# Patient Record
Sex: FEMALE | Race: WHITE | NOT HISPANIC OR LATINO | URBAN - METROPOLITAN AREA
[De-identification: names, ages, dates, MRNs, and addresses within clinical notes are randomized per-mention and may not be internally consistent; named-entity substitution may affect disease eponyms.]

---

## 2020-07-03 ENCOUNTER — INPATIENT (INPATIENT)
Facility: HOSPITAL | Age: 60
LOS: 3 days | Discharge: HOME HEALTH | End: 2020-07-07
Attending: SURGERY | Admitting: SURGERY
Payer: COMMERCIAL

## 2020-07-03 VITALS
WEIGHT: 240.08 LBS | DIASTOLIC BLOOD PRESSURE: 80 MMHG | TEMPERATURE: 99 F | OXYGEN SATURATION: 100 % | HEART RATE: 95 BPM | SYSTOLIC BLOOD PRESSURE: 122 MMHG

## 2020-07-03 DIAGNOSIS — Z98.891 HISTORY OF UTERINE SCAR FROM PREVIOUS SURGERY: Chronic | ICD-10-CM

## 2020-07-03 DIAGNOSIS — Z90.09 ACQUIRED ABSENCE OF OTHER PART OF HEAD AND NECK: Chronic | ICD-10-CM

## 2020-07-03 LAB
ALBUMIN SERPL ELPH-MCNC: 3.4 G/DL — LOW (ref 3.5–5.2)
ALP SERPL-CCNC: 166 U/L — HIGH (ref 30–115)
ALT FLD-CCNC: 29 U/L — SIGNIFICANT CHANGE UP (ref 0–41)
ANION GAP SERPL CALC-SCNC: 16 MMOL/L — HIGH (ref 7–14)
AST SERPL-CCNC: 14 U/L — SIGNIFICANT CHANGE UP (ref 0–41)
BILIRUB DIRECT SERPL-MCNC: 0.2 MG/DL — SIGNIFICANT CHANGE UP (ref 0–0.2)
BILIRUB INDIRECT FLD-MCNC: 0.5 MG/DL — SIGNIFICANT CHANGE UP (ref 0.2–1.2)
BILIRUB SERPL-MCNC: 0.7 MG/DL — SIGNIFICANT CHANGE UP (ref 0.2–1.2)
BLD GP AB SCN SERPL QL: SIGNIFICANT CHANGE UP
BUN SERPL-MCNC: 21 MG/DL — HIGH (ref 10–20)
CALCIUM SERPL-MCNC: 8.9 MG/DL — SIGNIFICANT CHANGE UP (ref 8.5–10.1)
CHLORIDE SERPL-SCNC: 96 MMOL/L — LOW (ref 98–110)
CO2 SERPL-SCNC: 23 MMOL/L — SIGNIFICANT CHANGE UP (ref 17–32)
CREAT SERPL-MCNC: 0.5 MG/DL — LOW (ref 0.7–1.5)
GLUCOSE SERPL-MCNC: 135 MG/DL — HIGH (ref 70–99)
HCT VFR BLD CALC: 39 % — SIGNIFICANT CHANGE UP (ref 37–47)
HGB BLD-MCNC: 12.7 G/DL — SIGNIFICANT CHANGE UP (ref 12–16)
LIDOCAIN IGE QN: 6 U/L — LOW (ref 7–60)
MCHC RBC-ENTMCNC: 27.7 PG — SIGNIFICANT CHANGE UP (ref 27–31)
MCHC RBC-ENTMCNC: 32.6 G/DL — SIGNIFICANT CHANGE UP (ref 32–37)
MCV RBC AUTO: 85.2 FL — SIGNIFICANT CHANGE UP (ref 81–99)
NRBC # BLD: 0 /100 WBCS — SIGNIFICANT CHANGE UP (ref 0–0)
PLATELET # BLD AUTO: 475 K/UL — HIGH (ref 130–400)
POTASSIUM SERPL-MCNC: 3.8 MMOL/L — SIGNIFICANT CHANGE UP (ref 3.5–5)
POTASSIUM SERPL-SCNC: 3.8 MMOL/L — SIGNIFICANT CHANGE UP (ref 3.5–5)
PROT SERPL-MCNC: 6.5 G/DL — SIGNIFICANT CHANGE UP (ref 6–8)
RBC # BLD: 4.58 M/UL — SIGNIFICANT CHANGE UP (ref 4.2–5.4)
RBC # FLD: 15.4 % — HIGH (ref 11.5–14.5)
SODIUM SERPL-SCNC: 135 MMOL/L — SIGNIFICANT CHANGE UP (ref 135–146)
WBC # BLD: 11.7 K/UL — HIGH (ref 4.8–10.8)
WBC # FLD AUTO: 11.7 K/UL — HIGH (ref 4.8–10.8)

## 2020-07-03 PROCEDURE — 93010 ELECTROCARDIOGRAM REPORT: CPT

## 2020-07-03 PROCEDURE — 99285 EMERGENCY DEPT VISIT HI MDM: CPT

## 2020-07-03 PROCEDURE — 74177 CT ABD & PELVIS W/CONTRAST: CPT | Mod: 26

## 2020-07-03 PROCEDURE — 76705 ECHO EXAM OF ABDOMEN: CPT | Mod: 26

## 2020-07-03 RX ORDER — ONDANSETRON 8 MG/1
4 TABLET, FILM COATED ORAL ONCE
Refills: 0 | Status: COMPLETED | OUTPATIENT
Start: 2020-07-03 | End: 2020-07-03

## 2020-07-03 RX ORDER — LEVOTHYROXINE SODIUM 125 MCG
200 TABLET ORAL DAILY
Refills: 0 | Status: DISCONTINUED | OUTPATIENT
Start: 2020-07-03 | End: 2020-07-07

## 2020-07-03 RX ORDER — MORPHINE SULFATE 50 MG/1
4 CAPSULE, EXTENDED RELEASE ORAL ONCE
Refills: 0 | Status: DISCONTINUED | OUTPATIENT
Start: 2020-07-03 | End: 2020-07-03

## 2020-07-03 RX ORDER — ATORVASTATIN CALCIUM 80 MG/1
20 TABLET, FILM COATED ORAL AT BEDTIME
Refills: 0 | Status: DISCONTINUED | OUTPATIENT
Start: 2020-07-03 | End: 2020-07-07

## 2020-07-03 RX ORDER — CIPROFLOXACIN LACTATE 400MG/40ML
400 VIAL (ML) INTRAVENOUS ONCE
Refills: 0 | Status: COMPLETED | OUTPATIENT
Start: 2020-07-03 | End: 2020-07-03

## 2020-07-03 RX ORDER — METRONIDAZOLE 500 MG
500 TABLET ORAL ONCE
Refills: 0 | Status: COMPLETED | OUTPATIENT
Start: 2020-07-03 | End: 2020-07-03

## 2020-07-03 RX ORDER — SODIUM CHLORIDE 9 MG/ML
1000 INJECTION, SOLUTION INTRAVENOUS
Refills: 0 | Status: DISCONTINUED | OUTPATIENT
Start: 2020-07-03 | End: 2020-07-04

## 2020-07-03 RX ORDER — PIPERACILLIN AND TAZOBACTAM 4; .5 G/20ML; G/20ML
3.38 INJECTION, POWDER, LYOPHILIZED, FOR SOLUTION INTRAVENOUS ONCE
Refills: 0 | Status: COMPLETED | OUTPATIENT
Start: 2020-07-03 | End: 2020-07-03

## 2020-07-03 RX ADMIN — ATORVASTATIN CALCIUM 20 MILLIGRAM(S): 80 TABLET, FILM COATED ORAL at 23:49

## 2020-07-03 RX ADMIN — SODIUM CHLORIDE 1000 MILLILITER(S): 9 INJECTION, SOLUTION INTRAVENOUS at 18:35

## 2020-07-03 RX ADMIN — ONDANSETRON 4 MILLIGRAM(S): 8 TABLET, FILM COATED ORAL at 18:40

## 2020-07-03 RX ADMIN — Medication 100 MILLIGRAM(S): at 19:24

## 2020-07-03 RX ADMIN — ONDANSETRON 4 MILLIGRAM(S): 8 TABLET, FILM COATED ORAL at 16:00

## 2020-07-03 RX ADMIN — Medication 200 MILLIGRAM(S): at 19:53

## 2020-07-03 RX ADMIN — MORPHINE SULFATE 4 MILLIGRAM(S): 50 CAPSULE, EXTENDED RELEASE ORAL at 18:40

## 2020-07-03 NOTE — ED PROVIDER NOTE - CLINICAL SUMMARY MEDICAL DECISION MAKING FREE TEXT BOX
Patient presented for 6 days of LLQ >RUQ pain + nausea. Diarrhea started today. Pt afebrile, not ill appearing. + LLQ TTP, mild RUQ tenderness. RUQ US showed cholelithiasis wo evidence of cholecystitis. Labs overall WNL. IVF zofran given. CT showed ___ Patient presented for 6 days of LLQ >RUQ pain + nausea. Diarrhea started today. Pt afebrile, not ill appearing. + LLQ TTP, mild RUQ tenderness. RUQ US showed cholelithiasis wo evidence of cholecystitis. Labs overall WNL. CT showed sigmoid diverticulitis with free air and 5.6cm deep pelvic abscess, along with reactive ileus. Gen surg consulted at 6:13. pt given IVF, morphine, zofran, zosyn and kept NPO. Patient presented for 6 days of LLQ >RUQ pain + nausea. Diarrhea started today. Pt afebrile, not ill appearing. + LLQ TTP, mild RUQ tenderness. RUQ US showed cholelithiasis wo evidence of cholecystitis. Labs overall WNL. CT showed sigmoid diverticulitis with free air and 5.6cm deep pelvic abscess, along with reactive ileus. Gen surg consulted at 6:13. pt given IVF, morphine, zofran, cipro, flagyl and kept NPO. Dr. Aguilar admitting to service and consulting IR for possible abscess drainage.

## 2020-07-03 NOTE — H&P ADULT - NSHPLABSRESULTS_GEN_ALL_CORE
LABS:  Labs:  CAPILLARY BLOOD GLUCOSE                        12.7   11.70 )-----------( 475      ( 03 Jul 2020 14:43 )             39.0         07-03    135  |  96<L>  |  21<H>  ----------------------------<  135<H>  3.8   |  23  |  0.5<L>      Calcium, Total Serum: 8.9 mg/dL (07-03-20 @ 14:43)      LFTs:             6.5  | 0.7  | 14       ------------------[166     ( 03 Jul 2020 14:43 )  3.4  | 0.2  | 29          Lipase:6      Amylase:x           Coags:      RADIOLOGY & ADDITIONAL STUDIES:    < from: CT Abdomen and Pelvis w/ IV Cont (07.03.20 @ 17:54) >  1. Findings consistent with perforated sigmoid diverticulitis with free air and 5.6 cm deep pelvic partially rim-enhancing early abscess.  2. Abnormally dilated small bowel, likely reactive ileus.  < end of copied text >

## 2020-07-03 NOTE — ED PROVIDER NOTE - CARE PLAN
Principal Discharge DX:	Diverticulitis of sigmoid colon  Secondary Diagnosis:	Abscess  Secondary Diagnosis:	Perforation and abscess of large intestine concurrent with and due to diverticulitis

## 2020-07-03 NOTE — H&P ADULT - ASSESSMENT
ASSESSMENT:  59y Female     PLAN:        D/w  ASSESSMENT:  59y Female with complaints of LLQ abd pain.    PLAN:  -f/u radiology  -Plan Operative management vs. Non-op management      D/w Dr. Aguilar ASSESSMENT:  59y Female PMH thyroidectomy with complaints of 5d LLQ abd pain.  LLQ tenderness, CT findings, WBC count consistent w/ perforated sigmoid diverticulitis w/ abscess    PLAN:  NPO, IVF  Abx   Pain control  IR consult  Rapid COVID  D/w Dr. Aguilar    Senior Note  I have personally examined and evaluated the patient  I agree with the above plan and note, and I have edited where appropriate  LLQ tenderness, CT findings, WBC 11.7 consistent w/ perforated sigmoid diverticulitis w/ abscess  Tachy to 102, will order fluid bolus  NPO, IVF. Cipro/flagyl in the setting of pen allergy. Pain control. Serial abd exams, CBCs. IR consult d/w MD [facility] #[id number] re: drainage of abscess  Surgical Attending Dr. Aguilar aware and agrees with plan

## 2020-07-03 NOTE — CONSULT NOTE ADULT - SUBJECTIVE AND OBJECTIVE BOX
INTERVENTIONAL RADIOLOGY CONSULT:     Procedure Requested: image guided abscess drainage    HPI:  DIONNE PETERSEN [id number]  59y Female    HPI: Pt complains of ABD pain since 5 days. She also admits to having constipation once in a while and fever will chills 2 days ago. Patient also mentioned having nausea and vomiting 2 days ago but not today. (2020 19:56)      PAST MEDICAL & SURGICAL HISTORY:  Acquired hypothyroidism  H/O:   H/O thyroidectomy      MEDICATIONS  (STANDING):  lactated ringers. 1000 milliLiter(s) (1000 mL/Hr) IV Continuous <Continuous>    MEDICATIONS  (PRN):      Allergies    penicillin (Other)    Intolerances        Social History:   Smoking: Yes [ ]  No [ ]   ______pk yrs  ETOH  Yes [ ]  No [ ]  Social [ ]  DRUGS:  Yes [ ]  No [ ]  if so what______________    FAMILY HISTORY:      Physical Exam:   Vital Signs Last 24 Hrs  T(C): 37.2 (2020 16:20), Max: 37.2 (2020 13:47)  T(F): 99 (2020 16:20), Max: 99 (2020 16:20)  HR: 102 (2020 16:20) (95 - 102)  BP: 112/56 (2020 16:20) (112/56 - 122/80)  BP(mean): --  RR: --  SpO2: 98% (2020 16:20) (98% - 100%)    General:     Lungs:    Cardiovascular:     Abdomen:    Extremities:    Musculoskeletal:    Neuro/Psych:        Labs:                         12.7   11.70 )-----------( 475      ( 2020 14:43 )             39.0     07-03    135  |  96<L>  |  21<H>  ----------------------------<  135<H>  3.8   |  23  |  0.5<L>    Ca    8.9      2020 14:43    TPro  6.5  /  Alb  3.4<L>  /  TBili  0.7  /  DBili  0.2  /  AST  14  /  ALT  29  /  AlkPhos  166<H>  07-03        Pertinent labs:                      12.7   11.70 )-----------( 475      ( 2020 14:43 )             39.0       -    135  |  96<L>  |  21<H>  ----------------------------<  135<H>  3.8   |  23  |  0.5<L>    Ca    8.9      2020 14:43    TPro  6.5  /  Alb  3.4<L>  /  TBili  0.7  /  DBili  0.2  /  AST  14  /  ALT  29  /  AlkPhos  166<H>            Radiology & Additional Studies:     Radiology imaging reviewed.       ASSESSMENT/ PLAN:           Risks, benefits, and alternatives to treatment discussed. All questions answered with understanding.    Thank you for the courtesy of this consult, please call [phone] with any further questions. INTERVENTIONAL RADIOLOGY CONSULT:     Procedure Requested: image guided abscess drainage    HPI: Patient is a 7y year old female w/perforated diverticulitis. She states she began having abdominal pain for about 5 days. Reports worsening of pain in her lower abdomen, worst in LLQ. States she had a BM today, loose stools. Having nausea, no vomiting today. Denies chest pain or SOB. . (2020 19:56)    PAST MEDICAL & SURGICAL HISTORY:  Acquired hypothyroidism  H/O:   H/O thyroidectomy for thyroid CA (    MEDICATIONS  Takes a statin and synthroid. No blood thinners    Allergies  penicillin - rash    Social History: Non-smoker    FAMILY HISTORY: No hx of cancer in family    Physical Exam:   Vital Signs Last 24 Hrs  T(C): 37.2 (2020 16:20), Max: 37.2 (2020 13:47)  T(F): 99 (2020 16:20), Max: 99 (2020 16:20)  HR: 102 (2020 16:20) (95 - 102)  BP: 112/56 (2020 16:20) (112/56 - 122/80)  SpO2: 98% (2020 16:20) (98% - 100%)    Gen: awake, NAD  Psych: affect and mood appropriate. Responds to questions appropriately  Neuro: no facial droop  Neck: no JVD  CV: normal AP diameter  Pulm: non-labored breathing  Abd: non distended  Ex: moving upper extremities spontaneously  Skin: no large lesions or rashes on the face or neck    Labs:                         12.7   11.70 )-----------( 475      ( 2020 14:43 )             39.0     07-03    135  |  96<L>  |  21<H>  ----------------------------<  135<H>  3.8   |  23  |  0.5<L>    Ca    8.9      2020 14:43    TPro  6.5  /  Alb  3.4<L>  /  TBili  0.7  /  DBili  0.2  /  AST  14  /  ALT  29  /  AlkPhos  166<H>  07-03    Radiology & Additional Studies:     7/3 - CT abdomen pelvis - 5.6 cm intermesenteric diverticular abscess within pelvis. Dilated loops of small bowel    A/P - 58 y/o F w/diverticular abscess    1. Abscess - Collection within the pelvis, narrow window for safe approach. May not be amenable to image guided percutaneous drainage. Will review imaging with attending. Please keep patient NPO.    Risks, benefits, and alternatives to treatment discussed. All questions answered with understanding.    Thank you for the courtesy of this consult, please call [phone] with any further questions.

## 2020-07-03 NOTE — H&P ADULT - HISTORY OF PRESENT ILLNESS
DIONNE PETERSEN [id number]  59y Female    HPI: DIONNE PETERSEN [id number]  59y Female    HPI: Pt complains of ABD pain since 5 days. She also admits to having constipation once in a while and fever will chills 2 days ago. Patient also mentioned having nausea and vomiting 2 days ago but not today. DIONNE PETERSEN [id number]  59y Female    HPI:   59F w/ PMH/PSH of thyroidectomy 2005 and radiation for thyroid neoplasm. Presents today w/ ABD pain for 5 days, located in the LLQ. Nonradiating. Associated with f/c and n/v 2 days ago. Never experienced before, no exacerbating/relieving fx. Pt denies CP, SOB, diarrhea, fever, chills, urinary symptoms. Last BM this AM, normal. Last c-scope 11 years ago, reportedly normal. ADELINA PETERSENSA [id number]  59y Female    HPI:   59F w/ PMH/PSH of thyroidectomy 2005 and radiation for thyroid neoplasm. Presents today w/ ABD pain for 5 days, located in the LLQ. Nonradiating. Associated with f/c and n/v 2 days ago. Never experienced before, no exacerbating/relieving fx. Pt denies CP, SOB, diarrhea, fever, chills, urinary symptoms. Last BM this AM, normal. Last c-scope 11 years ago, reportedly normal.      .

## 2020-07-03 NOTE — ED ADULT NURSE NOTE - NSIMPLEMENTINTERV_GEN_ALL_ED
Implemented All Universal Safety Interventions:  [place] to call system. Call bell, personal items and telephone within reach. Instruct patient to call for assistance. Room bathroom lighting operational. Non-slip footwear when patient is off stretcher. Physically safe environment: no spills, clutter or unnecessary equipment. Stretcher in lowest position, wheels locked, appropriate side rails in place.

## 2020-07-03 NOTE — ED PROVIDER NOTE - PROGRESS NOTE DETAILS
Pt presenting with LLQ pain, nausea, diarrhea. On exam LLQ + RUQ TTP. Pt afebrile, not ill-appearing. Will eval for diverticulitis, cholecystitis/cholelithiasis, colitis, will give IVF, zofran, obtain labs, UA, CT abd/pelvis, RUQ US, and will re-eval. Preliminary labs and imaging reviewed. Gallstone with sludge noted on US, no evidence of acute cholecystitis. Pt reports improvement of Sx. Pending CT scan and will re-eval. IVF, zofran given. Spoke to surgery resident initially at 18:13, and again right now...they will get back to me whether Dr. Aguilar will be admitting (saw her in 2005) or medicine service. Pain controlled. Will re-eval in 30mins.

## 2020-07-03 NOTE — H&P ADULT - NSHPSOURCEINFORD_GEN_ALL_CORE
CERTIFICATE OF SCHOOL    November 28, 2017      Re: Hilda Blankenship  [address]  [facility] [address]      This is to certify that Hilda Blankenship has been under my care from 11/28/2017 and can return to school today    RESTRICTIONS: none        SIGNATURE:___________________________________________,   11/28/2017        Stalin Luna MD  [facility]  Dermatology  [address]  [address] [phone]           Chart(s)/Patient

## 2020-07-03 NOTE — H&P ADULT - NSHPPHYSICALEXAM_GEN_ALL_CORE
Vital Signs Last 24 Hrs  T(C): 37.2 (03 Jul 2020 16:20), Max: 37.2 (03 Jul 2020 13:47)  T(F): 99 (03 Jul 2020 16:20), Max: 99 (03 Jul 2020 16:20)  HR: 102 (03 Jul 2020 16:20) (95 - 102)  BP: 112/56 (03 Jul 2020 16:20) (112/56 - 122/80)  BP(mean): --  RR: --  SpO2: 98% (03 Jul 2020 16:20) (98% - 100%)    PHYSICAL EXAM:  GENERAL: NAD, well-appearing  CHEST/LUNG: Clear to auscultation bilaterally  HEART: Regular rate and rhythm  ABDOMEN: Soft, Nontender, Nondistended;   EXTREMITIES:  No clubbing, cyanosis, or edema Vital Signs Last 24 Hrs  T(C): 37.2 (03 Jul 2020 16:20), Max: 37.2 (03 Jul 2020 13:47)  T(F): 99 (03 Jul 2020 16:20), Max: 99 (03 Jul 2020 16:20)  HR: 102 (03 Jul 2020 16:20) (95 - 102)  BP: 112/56 (03 Jul 2020 16:20) (112/56 - 122/80)  BP(mean): --  RR: --  SpO2: 98% (03 Jul 2020 16:20) (98% - 100%)    PHYSICAL EXAM:  GENERAL: NAD, well-appearing  HEENT: Patient has a birth franky on her left upper eyelid  CHEST/LUNG: Clear to auscultation bilaterally  HEART: Regular rate and rhythm  ABDOMEN: tenderness LLQ+, mild generalized abd pain  EXTREMITIES:  No clubbing, cyanosis, or edema Vital Signs Last 24 Hrs  T(C): 37.2 (03 Jul 2020 16:20), Max: 37.2 (03 Jul 2020 13:47)  T(F): 99 (03 Jul 2020 16:20), Max: 99 (03 Jul 2020 16:20)  HR: 102 (03 Jul 2020 16:20) (95 - 102)  BP: 112/56 (03 Jul 2020 16:20) (112/56 - 122/80)  BP(mean): --  RR: --  SpO2: 98% (03 Jul 2020 16:20) (98% - 100%)    PHYSICAL EXAM:  GENERAL: NAD, well-appearing  HEENT: Patient has a birth franky on her left upper eyelid  CHEST/LUNG: Clear to auscultation bilaterally  HEART: Regular rate and rhythm  ABDOMEN: tenderness LLQ+, soft, nondistended, obese  EXTREMITIES:  No clubbing, cyanosis, or edema

## 2020-07-03 NOTE — H&P ADULT - ATTENDING COMMENTS
above noted ct scan noted labs noted discussed case with surgical chief resident for repeat ct scan on 7/5/20  drainage by IR on monday

## 2020-07-03 NOTE — ED ADULT TRIAGE NOTE - CHIEF COMPLAINT QUOTE
Patient c/o LLQ pain with diarrhea and nausea x 6 days, Patient denies and fevers and recent antibiotic use.

## 2020-07-03 NOTE — ED PROVIDER NOTE - PHYSICAL EXAMINATION
CONSTITUTIONAL: Well-developed; well-nourished; in no acute distress. Sitting up and providing appropriate history and physical examination  SKIN: skin exam is warm and dry, no acute rash.  HEAD: Normocephalic; atraumatic.  EYES: PERRL, 3 mm bilateral, no nystagmus, EOM intact; conjunctiva and sclera clear.  ENT: No nasal discharge; airway clear.  NECK: Supple; non tender. + full passive ROM in all directions. No JVD  CARD: S1, S2 normal; no murmurs, gallops, or rubs. Regular rate and rhythm. + Symmetric Strong Pulses  RESP: No wheezes, rales or rhonchi. Good air movement bilaterally  ABD: soft; non-distended; Moderate TTP LLQ. Mild tenderness RUQ. No Rebound, No Guarding, No signs of peritonitis, No CVA tenderness. No pulsatile abdominal mass. + Strong and Symmetric Pulses  EXT: Normal ROM. No clubbing, cyanosis or edema. Dp and Pt Pulses intact. Cap refill less than 3 seconds  NEURO: CN 2-12 intact, normal finger to nose, normal romberg, stable gait, no sensory or motor deficits, Alert, oriented, grossly unremarkable. No Focal deficits. GCS 15. NIH 0  PSYCH: Cooperative, appropriate..

## 2020-07-03 NOTE — ED PROVIDER NOTE - NS ED ROS FT
Constitutional: See HPI.  Eyes: No visual changes, eye pain or discharge. No Photophobia  ENMT: No hearing changes, pain, discharge or infections. No neck pain or stiffness. No limited ROM  Cardiac: No SOB or edema. No chest pain with exertion.  Respiratory: No cough or respiratory distress. No hemoptysis. No history of asthma or RAD.  GI: + abd pain, diarrhea, nausea. No vomiting. No bloody or melanotic stool.   : No dysuria, frequency or burning. No Discharge  MS: No myalgia, muscle weakness, joint pain or back pain.  Neuro: No headache or weakness. No LOC.  Skin: No skin rash.  Except as documented in the HPI, all other systems are negative.

## 2020-07-03 NOTE — ED PROVIDER NOTE - OBJECTIVE STATEMENT
59F with remote Hx CS, thyroidectomy who presents to [facility] for LLQ pain x 6 days, dull, intermittent, no exacerbating factors, associated with nausea wo vomiting. Today she began having loose diarrhea, and felt weak all over and decided to be evaluated further. Denies bloody/melanotic stool, urinary Sx, fevers, chills, recent travel. Last colonoscopy was 1 year ago and was WNL. No Hx of diverticulitis or kidney stones.

## 2020-07-04 LAB
ANION GAP SERPL CALC-SCNC: 14 MMOL/L — SIGNIFICANT CHANGE UP (ref 7–14)
ANION GAP SERPL CALC-SCNC: 16 MMOL/L — HIGH (ref 7–14)
APPEARANCE UR: ABNORMAL
APTT BLD: 24.8 SEC — LOW (ref 27–39.2)
BACTERIA # UR AUTO: ABNORMAL
BASOPHILS # BLD AUTO: 0.03 K/UL — SIGNIFICANT CHANGE UP (ref 0–0.2)
BASOPHILS # BLD AUTO: 0.04 K/UL — SIGNIFICANT CHANGE UP (ref 0–0.2)
BASOPHILS NFR BLD AUTO: 0.3 % — SIGNIFICANT CHANGE UP (ref 0–1)
BASOPHILS NFR BLD AUTO: 0.3 % — SIGNIFICANT CHANGE UP (ref 0–1)
BILIRUB UR-MCNC: ABNORMAL
BUN SERPL-MCNC: 12 MG/DL — SIGNIFICANT CHANGE UP (ref 10–20)
BUN SERPL-MCNC: 21 MG/DL — HIGH (ref 10–20)
CALCIUM SERPL-MCNC: 7.5 MG/DL — LOW (ref 8.5–10.1)
CALCIUM SERPL-MCNC: 8.3 MG/DL — LOW (ref 8.5–10.1)
CHLORIDE SERPL-SCNC: 95 MMOL/L — LOW (ref 98–110)
CHLORIDE SERPL-SCNC: 99 MMOL/L — SIGNIFICANT CHANGE UP (ref 98–110)
CO2 SERPL-SCNC: 24 MMOL/L — SIGNIFICANT CHANGE UP (ref 17–32)
CO2 SERPL-SCNC: 25 MMOL/L — SIGNIFICANT CHANGE UP (ref 17–32)
COLOR SPEC: YELLOW — SIGNIFICANT CHANGE UP
CREAT SERPL-MCNC: 0.5 MG/DL — LOW (ref 0.7–1.5)
CREAT SERPL-MCNC: 0.6 MG/DL — LOW (ref 0.7–1.5)
DIFF PNL FLD: ABNORMAL
EOSINOPHIL # BLD AUTO: 0.04 K/UL — SIGNIFICANT CHANGE UP (ref 0–0.7)
EOSINOPHIL # BLD AUTO: 0.07 K/UL — SIGNIFICANT CHANGE UP (ref 0–0.7)
EOSINOPHIL NFR BLD AUTO: 0.3 % — SIGNIFICANT CHANGE UP (ref 0–8)
EOSINOPHIL NFR BLD AUTO: 0.7 % — SIGNIFICANT CHANGE UP (ref 0–8)
EPI CELLS # UR: 7 /HPF — HIGH (ref 0–5)
GLUCOSE SERPL-MCNC: 86 MG/DL — SIGNIFICANT CHANGE UP (ref 70–99)
GLUCOSE SERPL-MCNC: 99 MG/DL — SIGNIFICANT CHANGE UP (ref 70–99)
GLUCOSE UR QL: NEGATIVE — SIGNIFICANT CHANGE UP
HCG SERPL-ACNC: <0.6 MIU/ML — SIGNIFICANT CHANGE UP
HCT VFR BLD CALC: 32.3 % — LOW (ref 37–47)
HCT VFR BLD CALC: 35 % — LOW (ref 37–47)
HCV AB S/CO SERPL IA: 0.03 COI — SIGNIFICANT CHANGE UP
HCV AB SERPL-IMP: SIGNIFICANT CHANGE UP
HGB BLD-MCNC: 10.3 G/DL — LOW (ref 12–16)
HGB BLD-MCNC: 11.4 G/DL — LOW (ref 12–16)
HYALINE CASTS # UR AUTO: 25 /LPF — HIGH (ref 0–7)
IMM GRANULOCYTES NFR BLD AUTO: 1.1 % — HIGH (ref 0.1–0.3)
IMM GRANULOCYTES NFR BLD AUTO: 1.4 % — HIGH (ref 0.1–0.3)
INR BLD: 1.32 RATIO — HIGH (ref 0.65–1.3)
KETONES UR-MCNC: ABNORMAL
LEUKOCYTE ESTERASE UR-ACNC: ABNORMAL
LYMPHOCYTES # BLD AUTO: 1.31 K/UL — SIGNIFICANT CHANGE UP (ref 1.2–3.4)
LYMPHOCYTES # BLD AUTO: 1.45 K/UL — SIGNIFICANT CHANGE UP (ref 1.2–3.4)
LYMPHOCYTES # BLD AUTO: 10 % — LOW (ref 20.5–51.1)
LYMPHOCYTES # BLD AUTO: 13.6 % — LOW (ref 20.5–51.1)
MAGNESIUM SERPL-MCNC: 1.7 MG/DL — LOW (ref 1.8–2.4)
MAGNESIUM SERPL-MCNC: 1.8 MG/DL — SIGNIFICANT CHANGE UP (ref 1.8–2.4)
MCHC RBC-ENTMCNC: 27.2 PG — SIGNIFICANT CHANGE UP (ref 27–31)
MCHC RBC-ENTMCNC: 27.9 PG — SIGNIFICANT CHANGE UP (ref 27–31)
MCHC RBC-ENTMCNC: 31.9 G/DL — LOW (ref 32–37)
MCHC RBC-ENTMCNC: 32.6 G/DL — SIGNIFICANT CHANGE UP (ref 32–37)
MCV RBC AUTO: 85.4 FL — SIGNIFICANT CHANGE UP (ref 81–99)
MCV RBC AUTO: 85.8 FL — SIGNIFICANT CHANGE UP (ref 81–99)
MONOCYTES # BLD AUTO: 1.05 K/UL — HIGH (ref 0.1–0.6)
MONOCYTES # BLD AUTO: 1.35 K/UL — HIGH (ref 0.1–0.6)
MONOCYTES NFR BLD AUTO: 10.3 % — HIGH (ref 1.7–9.3)
MONOCYTES NFR BLD AUTO: 9.8 % — HIGH (ref 1.7–9.3)
NEUTROPHILS # BLD AUTO: 10.23 K/UL — HIGH (ref 1.4–6.5)
NEUTROPHILS # BLD AUTO: 7.95 K/UL — HIGH (ref 1.4–6.5)
NEUTROPHILS NFR BLD AUTO: 74.5 % — SIGNIFICANT CHANGE UP (ref 42.2–75.2)
NEUTROPHILS NFR BLD AUTO: 77.7 % — HIGH (ref 42.2–75.2)
NITRITE UR-MCNC: NEGATIVE — SIGNIFICANT CHANGE UP
NRBC # BLD: 0 /100 WBCS — SIGNIFICANT CHANGE UP (ref 0–0)
NRBC # BLD: 0 /100 WBCS — SIGNIFICANT CHANGE UP (ref 0–0)
PH UR: 6.5 — SIGNIFICANT CHANGE UP (ref 5–8)
PHOSPHATE SERPL-MCNC: 2.6 MG/DL — SIGNIFICANT CHANGE UP (ref 2.1–4.9)
PHOSPHATE SERPL-MCNC: 4.1 MG/DL — SIGNIFICANT CHANGE UP (ref 2.1–4.9)
PLATELET # BLD AUTO: 394 K/UL — SIGNIFICANT CHANGE UP (ref 130–400)
PLATELET # BLD AUTO: 427 K/UL — HIGH (ref 130–400)
POTASSIUM SERPL-MCNC: 3.4 MMOL/L — LOW (ref 3.5–5)
POTASSIUM SERPL-MCNC: 3.6 MMOL/L — SIGNIFICANT CHANGE UP (ref 3.5–5)
POTASSIUM SERPL-SCNC: 3.4 MMOL/L — LOW (ref 3.5–5)
POTASSIUM SERPL-SCNC: 3.6 MMOL/L — SIGNIFICANT CHANGE UP (ref 3.5–5)
PROT UR-MCNC: ABNORMAL
PROTHROM AB SERPL-ACNC: 15.2 SEC — HIGH (ref 9.95–12.87)
RBC # BLD: 3.78 M/UL — LOW (ref 4.2–5.4)
RBC # BLD: 4.08 M/UL — LOW (ref 4.2–5.4)
RBC # FLD: 15.7 % — HIGH (ref 11.5–14.5)
RBC # FLD: 15.8 % — HIGH (ref 11.5–14.5)
RBC CASTS # UR COMP ASSIST: 1 /HPF — SIGNIFICANT CHANGE UP (ref 0–4)
SARS-COV-2 RNA SPEC QL NAA+PROBE: SIGNIFICANT CHANGE UP
SODIUM SERPL-SCNC: 136 MMOL/L — SIGNIFICANT CHANGE UP (ref 135–146)
SODIUM SERPL-SCNC: 137 MMOL/L — SIGNIFICANT CHANGE UP (ref 135–146)
SP GR SPEC: 1.03 — HIGH (ref 1.01–1.02)
UROBILINOGEN FLD QL: SIGNIFICANT CHANGE UP
WBC # BLD: 10.67 K/UL — SIGNIFICANT CHANGE UP (ref 4.8–10.8)
WBC # BLD: 13.15 K/UL — HIGH (ref 4.8–10.8)
WBC # FLD AUTO: 10.67 K/UL — SIGNIFICANT CHANGE UP (ref 4.8–10.8)
WBC # FLD AUTO: 13.15 K/UL — HIGH (ref 4.8–10.8)
WBC UR QL: 91 /HPF — HIGH (ref 0–5)

## 2020-07-04 PROCEDURE — 71045 X-RAY EXAM CHEST 1 VIEW: CPT | Mod: 26

## 2020-07-04 RX ORDER — OXYCODONE HYDROCHLORIDE 5 MG/1
5 TABLET ORAL EVERY 6 HOURS
Refills: 0 | Status: DISCONTINUED | OUTPATIENT
Start: 2020-07-03 | End: 2020-07-04

## 2020-07-04 RX ORDER — DEXTROSE MONOHYDRATE, SODIUM CHLORIDE, AND POTASSIUM CHLORIDE 50; .745; 4.5 G/1000ML; G/1000ML; G/1000ML
1000 INJECTION, SOLUTION INTRAVENOUS
Refills: 0 | Status: DISCONTINUED | OUTPATIENT
Start: 2020-07-04 | End: 2020-07-07

## 2020-07-04 RX ORDER — SODIUM CHLORIDE 9 MG/ML
1000 INJECTION, SOLUTION INTRAVENOUS
Refills: 0 | Status: DISCONTINUED | OUTPATIENT
Start: 2020-07-03 | End: 2020-07-04

## 2020-07-04 RX ORDER — METRONIDAZOLE 500 MG
500 TABLET ORAL EVERY 8 HOURS
Refills: 0 | Status: DISCONTINUED | OUTPATIENT
Start: 2020-07-04 | End: 2020-07-07

## 2020-07-04 RX ORDER — IBUPROFEN 200 MG
600 TABLET ORAL EVERY 8 HOURS
Refills: 0 | Status: DISCONTINUED | OUTPATIENT
Start: 2020-07-03 | End: 2020-07-04

## 2020-07-04 RX ORDER — CHLORHEXIDINE GLUCONATE 213 G/1000ML
1 SOLUTION TOPICAL
Refills: 0 | Status: DISCONTINUED | OUTPATIENT
Start: 2020-07-04 | End: 2020-07-07

## 2020-07-04 RX ORDER — PANTOPRAZOLE SODIUM 20 MG/1
40 TABLET, DELAYED RELEASE ORAL DAILY
Refills: 0 | Status: DISCONTINUED | OUTPATIENT
Start: 2020-07-04 | End: 2020-07-04

## 2020-07-04 RX ORDER — POTASSIUM CHLORIDE 20 MEQ
20 PACKET (EA) ORAL
Refills: 0 | Status: COMPLETED | OUTPATIENT
Start: 2020-07-04 | End: 2020-07-05

## 2020-07-04 RX ORDER — CIPROFLOXACIN LACTATE 400MG/40ML
400 VIAL (ML) INTRAVENOUS EVERY 12 HOURS
Refills: 0 | Status: DISCONTINUED | OUTPATIENT
Start: 2020-07-04 | End: 2020-07-07

## 2020-07-04 RX ORDER — MAGNESIUM SULFATE 500 MG/ML
2 VIAL (ML) INJECTION ONCE
Refills: 0 | Status: COMPLETED | OUTPATIENT
Start: 2020-07-04 | End: 2020-07-05

## 2020-07-04 RX ORDER — ACETAMINOPHEN 500 MG
650 TABLET ORAL EVERY 6 HOURS
Refills: 0 | Status: DISCONTINUED | OUTPATIENT
Start: 2020-07-04 | End: 2020-07-07

## 2020-07-04 RX ORDER — ONDANSETRON 8 MG/1
4 TABLET, FILM COATED ORAL EVERY 8 HOURS
Refills: 0 | Status: DISCONTINUED | OUTPATIENT
Start: 2020-07-04 | End: 2020-07-07

## 2020-07-04 RX ORDER — HEPARIN SODIUM 5000 [USP'U]/ML
5000 INJECTION INTRAVENOUS; SUBCUTANEOUS EVERY 8 HOURS
Refills: 0 | Status: DISCONTINUED | OUTPATIENT
Start: 2020-07-03 | End: 2020-07-07

## 2020-07-04 RX ORDER — PANTOPRAZOLE SODIUM 20 MG/1
40 TABLET, DELAYED RELEASE ORAL DAILY
Refills: 0 | Status: DISCONTINUED | OUTPATIENT
Start: 2020-07-04 | End: 2020-07-07

## 2020-07-04 RX ORDER — METRONIDAZOLE 500 MG
500 TABLET ORAL EVERY 8 HOURS
Refills: 0 | Status: DISCONTINUED | OUTPATIENT
Start: 2020-07-04 | End: 2020-07-04

## 2020-07-04 RX ORDER — ACETAMINOPHEN 500 MG
650 TABLET ORAL EVERY 6 HOURS
Refills: 0 | Status: DISCONTINUED | OUTPATIENT
Start: 2020-07-03 | End: 2020-07-04

## 2020-07-04 RX ADMIN — Medication 100 MILLIGRAM(S): at 13:56

## 2020-07-04 RX ADMIN — Medication 200 MILLIGRAM(S): at 18:30

## 2020-07-04 RX ADMIN — Medication 500 MILLIGRAM(S): at 05:10

## 2020-07-04 RX ADMIN — HEPARIN SODIUM 5000 UNIT(S): 5000 INJECTION INTRAVENOUS; SUBCUTANEOUS at 13:56

## 2020-07-04 RX ADMIN — Medication 200 MICROGRAM(S): at 05:15

## 2020-07-04 RX ADMIN — Medication 200 MILLIGRAM(S): at 05:11

## 2020-07-04 RX ADMIN — Medication 100 MILLIGRAM(S): at 21:05

## 2020-07-04 RX ADMIN — ATORVASTATIN CALCIUM 20 MILLIGRAM(S): 80 TABLET, FILM COATED ORAL at 21:05

## 2020-07-04 RX ADMIN — DEXTROSE MONOHYDRATE, SODIUM CHLORIDE, AND POTASSIUM CHLORIDE 125 MILLILITER(S): 50; .745; 4.5 INJECTION, SOLUTION INTRAVENOUS at 15:13

## 2020-07-04 RX ADMIN — HEPARIN SODIUM 5000 UNIT(S): 5000 INJECTION INTRAVENOUS; SUBCUTANEOUS at 21:05

## 2020-07-04 RX ADMIN — ONDANSETRON 104 MILLIGRAM(S): 8 TABLET, FILM COATED ORAL at 12:15

## 2020-07-04 RX ADMIN — HEPARIN SODIUM 5000 UNIT(S): 5000 INJECTION INTRAVENOUS; SUBCUTANEOUS at 05:15

## 2020-07-04 RX ADMIN — ONDANSETRON 104 MILLIGRAM(S): 8 TABLET, FILM COATED ORAL at 01:00

## 2020-07-04 RX ADMIN — PANTOPRAZOLE SODIUM 40 MILLIGRAM(S): 20 TABLET, DELAYED RELEASE ORAL at 13:05

## 2020-07-04 RX ADMIN — SODIUM CHLORIDE 125 MILLILITER(S): 9 INJECTION, SOLUTION INTRAVENOUS at 01:14

## 2020-07-05 LAB
ANION GAP SERPL CALC-SCNC: 7 MMOL/L — SIGNIFICANT CHANGE UP (ref 7–14)
BUN SERPL-MCNC: 5 MG/DL — LOW (ref 10–20)
CALCIUM SERPL-MCNC: 7.8 MG/DL — LOW (ref 8.5–10.1)
CHLORIDE SERPL-SCNC: 103 MMOL/L — SIGNIFICANT CHANGE UP (ref 98–110)
CO2 SERPL-SCNC: 27 MMOL/L — SIGNIFICANT CHANGE UP (ref 17–32)
CREAT SERPL-MCNC: <0.5 MG/DL — LOW (ref 0.7–1.5)
CULTURE RESULTS: SIGNIFICANT CHANGE UP
GLUCOSE SERPL-MCNC: 116 MG/DL — HIGH (ref 70–99)
POTASSIUM SERPL-MCNC: 3.4 MMOL/L — LOW (ref 3.5–5)
POTASSIUM SERPL-SCNC: 3.4 MMOL/L — LOW (ref 3.5–5)
SODIUM SERPL-SCNC: 137 MMOL/L — SIGNIFICANT CHANGE UP (ref 135–146)
SPECIMEN SOURCE: SIGNIFICANT CHANGE UP

## 2020-07-05 PROCEDURE — 71045 X-RAY EXAM CHEST 1 VIEW: CPT | Mod: 26

## 2020-07-05 RX ORDER — IOHEXOL 300 MG/ML
30 INJECTION, SOLUTION INTRAVENOUS ONCE
Refills: 0 | Status: COMPLETED | OUTPATIENT
Start: 2020-07-05 | End: 2020-07-05

## 2020-07-05 RX ADMIN — DEXTROSE MONOHYDRATE, SODIUM CHLORIDE, AND POTASSIUM CHLORIDE 125 MILLILITER(S): 50; .745; 4.5 INJECTION, SOLUTION INTRAVENOUS at 08:26

## 2020-07-05 RX ADMIN — ATORVASTATIN CALCIUM 20 MILLIGRAM(S): 80 TABLET, FILM COATED ORAL at 21:10

## 2020-07-05 RX ADMIN — Medication 200 MILLIGRAM(S): at 17:05

## 2020-07-05 RX ADMIN — Medication 50 GRAM(S): at 10:49

## 2020-07-05 RX ADMIN — Medication 50 MILLIEQUIVALENT(S): at 04:41

## 2020-07-05 RX ADMIN — HEPARIN SODIUM 5000 UNIT(S): 5000 INJECTION INTRAVENOUS; SUBCUTANEOUS at 21:09

## 2020-07-05 RX ADMIN — Medication 100 MILLIGRAM(S): at 07:18

## 2020-07-05 RX ADMIN — Medication 50 MILLIEQUIVALENT(S): at 02:22

## 2020-07-05 RX ADMIN — DEXTROSE MONOHYDRATE, SODIUM CHLORIDE, AND POTASSIUM CHLORIDE 125 MILLILITER(S): 50; .745; 4.5 INJECTION, SOLUTION INTRAVENOUS at 18:27

## 2020-07-05 RX ADMIN — Medication 100 MILLIGRAM(S): at 13:58

## 2020-07-05 RX ADMIN — Medication 200 MILLIGRAM(S): at 05:03

## 2020-07-05 RX ADMIN — IOHEXOL 30 MILLILITER(S): 300 INJECTION, SOLUTION INTRAVENOUS at 19:48

## 2020-07-05 RX ADMIN — Medication 200 MICROGRAM(S): at 05:04

## 2020-07-05 RX ADMIN — HEPARIN SODIUM 5000 UNIT(S): 5000 INJECTION INTRAVENOUS; SUBCUTANEOUS at 13:11

## 2020-07-05 RX ADMIN — Medication 650 MILLIGRAM(S): at 12:23

## 2020-07-05 RX ADMIN — Medication 100 MILLIGRAM(S): at 21:09

## 2020-07-05 RX ADMIN — Medication 50 MILLIEQUIVALENT(S): at 08:27

## 2020-07-05 RX ADMIN — PANTOPRAZOLE SODIUM 40 MILLIGRAM(S): 20 TABLET, DELAYED RELEASE ORAL at 11:56

## 2020-07-05 RX ADMIN — HEPARIN SODIUM 5000 UNIT(S): 5000 INJECTION INTRAVENOUS; SUBCUTANEOUS at 05:03

## 2020-07-05 NOTE — PROGRESS NOTE ADULT - SUBJECTIVE AND OBJECTIVE BOX
GENERAL SURGERY PROGRESS NOTE     DIONNE PETERSEN  59y  Female  Hospital day :2d    OVERNIGHT EVENTS: Patient stable, refers mild pain, no fever, no N/V. F/U IR, they will discuss the possibility for drainage guided by CT with the attending.  Mild electrolytes imbalance (hypokalemia, hypomagnesemia), corrections were made    T(F): 98.8 (20 @ 19:30), Max: 98.8 (20 @ 19:30)  HR: 89 (20 @ 19:30) (89 - 98)  BP: 120/58 (20 @ 19:30) (104/50 - 120/58)  RR: 18 (20 @ 19:30) (18 - 18)  SpO2: 96% (20 @ 16:15) (96% - 98%)    DIET/FLUIDS: dextrose 5% + sodium chloride 0.45% with potassium chloride 10 mEq/L 1000 milliLiter(s) IV Continuous <Continuous>  magnesium sulfate  IVPB 2 Gram(s) IV Intermittent once  potassium chloride  20 mEq/100 mL IVPB 20 milliEquivalent(s) IV Intermittent every 2 hours    GI proph:  pantoprazole  Injectable 40 milliGRAM(s) IV Push daily    AC/ proph: heparin   Injectable 5000 Unit(s) SubCutaneous every 8 hours    ABx: ciprofloxacin   IVPB 400 milliGRAM(s) IV Intermittent every 12 hours  metroNIDAZOLE  IVPB 500 milliGRAM(s) IV Intermittent every 8 hours      PHYSICAL EXAM:  GENERAL: NAD, well-appearing  CHEST/LUNG: Clear to auscultation bilaterally  HEART: Regular rate and rhythm  ABDOMEN: Soft, Nontender, Nondistended; pain on  palpation in L abdominal quadrant   EXTREMITIES:  No clubbing, cyanosis, or edema      LABS                          10.3   10.67 )-----------( 394      ( 2020 20:44 )             32.3       Auto Neutrophil %: 74.5 % (20 @ 20:44)  Auto Immature Granulocyte %: 1.1 % (20 @ 20:44)  Auto Immature Granulocyte %: 1.4 % (20 @ 05:20)  Auto Neutrophil %: 77.7 % (20 @ 05:20)        137  |  99  |  12  ----------------------------<  86  3.4<L>   |  24  |  0.5<L>      Calcium, Total Serum: 7.5 mg/dL (20 @ 20:44)      LFTs:             6.5  | 0.7  | 14       ------------------[166     ( 2020 14:43 )  3.4  | 0.2  | 29          Lipase:6             Coags:     15.20  ----< 1.32    ( 2020 05:20 )     24.8            Urinalysis Basic - ( 2020 15:19 )    Color: Yellow / Appearance: Slightly Turbid / S.035 / pH: x  Gluc: x / Ketone: Small  / Bili: Small / Urobili: <2 mg/dL   Blood: x / Protein: 30 mg/dL / Nitrite: Negative   Leuk Esterase: Large / RBC: 1 /HPF / WBC 91 /HPF   Sq Epi: x / Non Sq Epi: 7 /HPF / Bacteria: Few        IR consult:  7/3 - CT abdomen pelvis - 5.6 cm intermesenteric diverticular abscess within pelvis. Dilated loops of small bowel  A/P - 60 y/o F w/diverticular abscess  1. Abscess - Collection within the pelvis, narrow window for safe approach. May not be amenable to image guided percutaneous drainage. Will review imaging with attending. Please keep patient NPO.  Risks, benefits, and alternatives to treatment discussed. All questions answered with understanding.  Thank you for the courtesy of this consult, please call [phone] with any further GENERAL SURGERY PROGRESS NOTE     DIONNE PETERSEN  59y  Female  Hospital day :2d    OVERNIGHT EVENTS: Patient stable, refers mild pain, no fever, no N/V. F/U IR, they will discuss the possibility for drainage guided by CT with the attending.  Mild electrolytes imbalance (hypokalemia, hypomagnesemia), corrections were made    T(F): 98.8 (20 @ 19:30), Max: 98.8 (20 @ 19:30)  HR: 89 (20 @ 19:30) (89 - 98)  BP: 120/58 (20 @ 19:30) (104/50 - 120/58)  RR: 18 (20 @ 19:30) (18 - 18)  SpO2: 96% (20 @ 16:15) (96% - 98%)    DIET/FLUIDS: dextrose 5% + sodium chloride 0.45% with potassium chloride 10 mEq/L 1000 milliLiter(s) IV Continuous <Continuous>  magnesium sulfate  IVPB 2 Gram(s) IV Intermittent once  potassium chloride  20 mEq/100 mL IVPB 20 milliEquivalent(s) IV Intermittent every 2 hours    GI proph:  pantoprazole  Injectable 40 milliGRAM(s) IV Push daily    AC/ proph: heparin   Injectable 5000 Unit(s) SubCutaneous every 8 hours    ABx: ciprofloxacin   IVPB 400 milliGRAM(s) IV Intermittent every 12 hours  metroNIDAZOLE  IVPB 500 milliGRAM(s) IV Intermittent every 8 hours      PHYSICAL EXAM:  GENERAL: NAD, well-appearing  CHEST/LUNG: Clear to auscultation bilaterally  HEART: Regular rate and rhythm  ABDOMEN: Soft, Nontender, Nondistended; mild pain on  palpation in L abdominal quadrant   EXTREMITIES:  No clubbing, cyanosis, or edema      LABS                          10.3   10.67 )-----------( 394      ( 2020 20:44 )             32.3       Auto Neutrophil %: 74.5 % (20 @ 20:44)  Auto Immature Granulocyte %: 1.1 % (20 20:44)  Auto Immature Granulocyte %: 1.4 % (20 @ 05:20)  Auto Neutrophil %: 77.7 % (20 @ 05:20)        137  |  99  |  12  ----------------------------<  86  3.4<L>   |  24  |  0.5<L>      Calcium, Total Serum: 7.5 mg/dL (20 @ 20:44)      LFTs:             6.5  | 0.7  | 14       ------------------[166     ( 2020 14:43 )  3.4  | 0.2  | 29          Lipase:6             Coags:     15.20  ----< 1.32    ( 2020 05:20 )     24.8            Urinalysis Basic - ( 2020 15:19 )    Color: Yellow / Appearance: Slightly Turbid / S.035 / pH: x  Gluc: x / Ketone: Small  / Bili: Small / Urobili: <2 mg/dL   Blood: x / Protein: 30 mg/dL / Nitrite: Negative   Leuk Esterase: Large / RBC: 1 /HPF / WBC 91 /HPF   Sq Epi: x / Non Sq Epi: 7 /HPF / Bacteria: Few        IR consult:  7/3 - CT abdomen pelvis - 5.6 cm intermesenteric diverticular abscess within pelvis. Dilated loops of small bowel  A/P - 60 y/o F w/diverticular abscess  1. Abscess - Collection within the pelvis, narrow window for safe approach. May not be amenable to image guided percutaneous drainage. Will review imaging with attending. Please keep patient NPO.  Risks, benefits, and alternatives to treatment discussed. All questions answered with understanding.  Thank you for the courtesy of this consult, please call [phone] with any further

## 2020-07-06 LAB
ANION GAP SERPL CALC-SCNC: 10 MMOL/L — SIGNIFICANT CHANGE UP (ref 7–14)
ANION GAP SERPL CALC-SCNC: 13 MMOL/L — SIGNIFICANT CHANGE UP (ref 7–14)
BASOPHILS # BLD AUTO: 0.04 K/UL — SIGNIFICANT CHANGE UP (ref 0–0.2)
BASOPHILS # BLD AUTO: 0.05 K/UL — SIGNIFICANT CHANGE UP (ref 0–0.2)
BASOPHILS NFR BLD AUTO: 0.5 % — SIGNIFICANT CHANGE UP (ref 0–1)
BASOPHILS NFR BLD AUTO: 0.6 % — SIGNIFICANT CHANGE UP (ref 0–1)
BUN SERPL-MCNC: 3 MG/DL — LOW (ref 10–20)
BUN SERPL-MCNC: 4 MG/DL — LOW (ref 10–20)
CALCIUM SERPL-MCNC: 7.8 MG/DL — LOW (ref 8.5–10.1)
CALCIUM SERPL-MCNC: 7.8 MG/DL — LOW (ref 8.5–10.1)
CHLORIDE SERPL-SCNC: 100 MMOL/L — SIGNIFICANT CHANGE UP (ref 98–110)
CHLORIDE SERPL-SCNC: 103 MMOL/L — SIGNIFICANT CHANGE UP (ref 98–110)
CO2 SERPL-SCNC: 25 MMOL/L — SIGNIFICANT CHANGE UP (ref 17–32)
CO2 SERPL-SCNC: 27 MMOL/L — SIGNIFICANT CHANGE UP (ref 17–32)
CREAT SERPL-MCNC: 0.5 MG/DL — LOW (ref 0.7–1.5)
CREAT SERPL-MCNC: 0.5 MG/DL — LOW (ref 0.7–1.5)
EOSINOPHIL # BLD AUTO: 0.17 K/UL — SIGNIFICANT CHANGE UP (ref 0–0.7)
EOSINOPHIL # BLD AUTO: 0.19 K/UL — SIGNIFICANT CHANGE UP (ref 0–0.7)
EOSINOPHIL NFR BLD AUTO: 2.1 % — SIGNIFICANT CHANGE UP (ref 0–8)
EOSINOPHIL NFR BLD AUTO: 2.3 % — SIGNIFICANT CHANGE UP (ref 0–8)
GLUCOSE SERPL-MCNC: 88 MG/DL — SIGNIFICANT CHANGE UP (ref 70–99)
GLUCOSE SERPL-MCNC: 97 MG/DL — SIGNIFICANT CHANGE UP (ref 70–99)
HCT VFR BLD CALC: 33.2 % — LOW (ref 37–47)
HCT VFR BLD CALC: 34.3 % — LOW (ref 37–47)
HGB BLD-MCNC: 10.6 G/DL — LOW (ref 12–16)
HGB BLD-MCNC: 10.9 G/DL — LOW (ref 12–16)
IMM GRANULOCYTES NFR BLD AUTO: 2.8 % — HIGH (ref 0.1–0.3)
IMM GRANULOCYTES NFR BLD AUTO: 4.8 % — HIGH (ref 0.1–0.3)
LYMPHOCYTES # BLD AUTO: 1.48 K/UL — SIGNIFICANT CHANGE UP (ref 1.2–3.4)
LYMPHOCYTES # BLD AUTO: 1.97 K/UL — SIGNIFICANT CHANGE UP (ref 1.2–3.4)
LYMPHOCYTES # BLD AUTO: 18 % — LOW (ref 20.5–51.1)
LYMPHOCYTES # BLD AUTO: 24.7 % — SIGNIFICANT CHANGE UP (ref 20.5–51.1)
MAGNESIUM SERPL-MCNC: 2 MG/DL — SIGNIFICANT CHANGE UP (ref 1.8–2.4)
MAGNESIUM SERPL-MCNC: 2.2 MG/DL — SIGNIFICANT CHANGE UP (ref 1.8–2.4)
MCHC RBC-ENTMCNC: 27.2 PG — SIGNIFICANT CHANGE UP (ref 27–31)
MCHC RBC-ENTMCNC: 27.8 PG — SIGNIFICANT CHANGE UP (ref 27–31)
MCHC RBC-ENTMCNC: 31.8 G/DL — LOW (ref 32–37)
MCHC RBC-ENTMCNC: 31.9 G/DL — LOW (ref 32–37)
MCV RBC AUTO: 85.5 FL — SIGNIFICANT CHANGE UP (ref 81–99)
MCV RBC AUTO: 87.1 FL — SIGNIFICANT CHANGE UP (ref 81–99)
MONOCYTES # BLD AUTO: 0.89 K/UL — HIGH (ref 0.1–0.6)
MONOCYTES # BLD AUTO: 0.94 K/UL — HIGH (ref 0.1–0.6)
MONOCYTES NFR BLD AUTO: 11.2 % — HIGH (ref 1.7–9.3)
MONOCYTES NFR BLD AUTO: 11.5 % — HIGH (ref 1.7–9.3)
NEUTROPHILS # BLD AUTO: 4.52 K/UL — SIGNIFICANT CHANGE UP (ref 1.4–6.5)
NEUTROPHILS # BLD AUTO: 5.31 K/UL — SIGNIFICANT CHANGE UP (ref 1.4–6.5)
NEUTROPHILS NFR BLD AUTO: 56.7 % — SIGNIFICANT CHANGE UP (ref 42.2–75.2)
NEUTROPHILS NFR BLD AUTO: 64.8 % — SIGNIFICANT CHANGE UP (ref 42.2–75.2)
NRBC # BLD: 0 /100 WBCS — SIGNIFICANT CHANGE UP (ref 0–0)
NRBC # BLD: 0 /100 WBCS — SIGNIFICANT CHANGE UP (ref 0–0)
PHOSPHATE SERPL-MCNC: 2.1 MG/DL — SIGNIFICANT CHANGE UP (ref 2.1–4.9)
PHOSPHATE SERPL-MCNC: 3.1 MG/DL — SIGNIFICANT CHANGE UP (ref 2.1–4.9)
PLATELET # BLD AUTO: 383 K/UL — SIGNIFICANT CHANGE UP (ref 130–400)
PLATELET # BLD AUTO: 427 K/UL — HIGH (ref 130–400)
POTASSIUM SERPL-MCNC: 3.2 MMOL/L — LOW (ref 3.5–5)
POTASSIUM SERPL-MCNC: 3.7 MMOL/L — SIGNIFICANT CHANGE UP (ref 3.5–5)
POTASSIUM SERPL-SCNC: 3.2 MMOL/L — LOW (ref 3.5–5)
POTASSIUM SERPL-SCNC: 3.7 MMOL/L — SIGNIFICANT CHANGE UP (ref 3.5–5)
RBC # BLD: 3.81 M/UL — LOW (ref 4.2–5.4)
RBC # BLD: 4.01 M/UL — LOW (ref 4.2–5.4)
RBC # FLD: 15.9 % — HIGH (ref 11.5–14.5)
RBC # FLD: 15.9 % — HIGH (ref 11.5–14.5)
SODIUM SERPL-SCNC: 138 MMOL/L — SIGNIFICANT CHANGE UP (ref 135–146)
SODIUM SERPL-SCNC: 140 MMOL/L — SIGNIFICANT CHANGE UP (ref 135–146)
WBC # BLD: 7.97 K/UL — SIGNIFICANT CHANGE UP (ref 4.8–10.8)
WBC # BLD: 8.2 K/UL — SIGNIFICANT CHANGE UP (ref 4.8–10.8)
WBC # FLD AUTO: 7.97 K/UL — SIGNIFICANT CHANGE UP (ref 4.8–10.8)
WBC # FLD AUTO: 8.2 K/UL — SIGNIFICANT CHANGE UP (ref 4.8–10.8)

## 2020-07-06 PROCEDURE — 49406 IMAGE CATH FLUID PERI/RETRO: CPT

## 2020-07-06 PROCEDURE — 99152 MOD SED SAME PHYS/QHP 5/>YRS: CPT

## 2020-07-06 PROCEDURE — 74177 CT ABD & PELVIS W/CONTRAST: CPT | Mod: 26

## 2020-07-06 RX ORDER — POTASSIUM CHLORIDE 20 MEQ
20 PACKET (EA) ORAL ONCE
Refills: 0 | Status: DISCONTINUED | OUTPATIENT
Start: 2020-07-06 | End: 2020-07-06

## 2020-07-06 RX ORDER — POTASSIUM CHLORIDE 20 MEQ
10 PACKET (EA) ORAL
Refills: 0 | Status: DISCONTINUED | OUTPATIENT
Start: 2020-07-06 | End: 2020-07-06

## 2020-07-06 RX ADMIN — HEPARIN SODIUM 5000 UNIT(S): 5000 INJECTION INTRAVENOUS; SUBCUTANEOUS at 13:12

## 2020-07-06 RX ADMIN — Medication 200 MILLIGRAM(S): at 05:12

## 2020-07-06 RX ADMIN — DEXTROSE MONOHYDRATE, SODIUM CHLORIDE, AND POTASSIUM CHLORIDE 75 MILLILITER(S): 50; .745; 4.5 INJECTION, SOLUTION INTRAVENOUS at 17:00

## 2020-07-06 RX ADMIN — PANTOPRAZOLE SODIUM 40 MILLIGRAM(S): 20 TABLET, DELAYED RELEASE ORAL at 13:13

## 2020-07-06 RX ADMIN — Medication 650 MILLIGRAM(S): at 01:48

## 2020-07-06 RX ADMIN — HEPARIN SODIUM 5000 UNIT(S): 5000 INJECTION INTRAVENOUS; SUBCUTANEOUS at 05:12

## 2020-07-06 RX ADMIN — ATORVASTATIN CALCIUM 20 MILLIGRAM(S): 80 TABLET, FILM COATED ORAL at 21:19

## 2020-07-06 RX ADMIN — HEPARIN SODIUM 5000 UNIT(S): 5000 INJECTION INTRAVENOUS; SUBCUTANEOUS at 21:19

## 2020-07-06 RX ADMIN — Medication 100 MILLIGRAM(S): at 21:20

## 2020-07-06 RX ADMIN — Medication 100 MILLIGRAM(S): at 05:12

## 2020-07-06 RX ADMIN — Medication 200 MICROGRAM(S): at 05:12

## 2020-07-06 RX ADMIN — Medication 200 MILLIGRAM(S): at 17:00

## 2020-07-06 RX ADMIN — Medication 100 MILLIGRAM(S): at 13:12

## 2020-07-06 RX ADMIN — Medication 650 MILLIGRAM(S): at 15:19

## 2020-07-06 NOTE — PHARMACOTHERAPY INTERVENTION NOTE - COMMENTS
s/w md- we have premixed k bags of 20meq- order was for 3 10meq bags- recommended to give 1 20meq bag and recheck level to see if additional doses are needed
s/w md- about both the fluid & k rider having potassium- as per md- they will d/c k rider

## 2020-07-06 NOTE — CHART NOTE - NSCHARTNOTEFT_GEN_A_CORE
Post Operative Check    Patient is sp IR drainage  Status post image-guided placement of an 8F pigtail drainage cathter into a pelvic fluid collection with removal of 60cc thin serous fluid, sent for laboratory analysis. Procedure well-tolerated.      Vitals    T(C): 36.7 (07-06-20 @ 14:27), Max: 37.1 (07-06-20 @ 01:39)  HR: 83 (07-06-20 @ 14:27) (77 - 83)  BP: 116/58 (07-06-20 @ 14:27) (108/58 - 131/61)  RR: 18 (07-06-20 @ 14:27) (18 - 18)  SpO2: 96% (07-06-20 @ 08:04) (96% - 96%)    07-05 @ 07:01  -  07-06 @ 07:00  --------------------------------------------------------  IN:    dextrose 5% + sodium chloride 0.45% with potassium chloride 10 mEq/L: 1375 mL    IV PiggyBack: 450 mL  Total IN: 1825 mL    OUT:    Voided: 725 mL  Total OUT: 725 mL    Total NET: 1100 mL          Physical Exam  General: NAD AAOx3   Cards: RRR S1S2  Resp: CTAB  Abdomen: mild tenderness of left lower quadrant     Labs  Labs:  CAPILLARY BLOOD GLUCOSE                              10.6   8.20  )-----------( 383      ( 05 Jul 2020 22:02 )             33.2       Auto Neutrophil %: 64.8 % (07-05-20 @ 22:02)  Auto Immature Granulocyte %: 2.8 % (07-05-20 @ 22:02)    07-05    138  |  100  |  4<L>  ----------------------------<  97  3.7   |  25  |  0.5<L>      Calcium, Total Serum: 7.8 mg/dL (07-05-20 @ 22:02)      Assessment  Patient is sp IR drainage  Status post image-guided placement of an 8F pigtail drainage cathter into a pelvic fluid collection with removal of 60cc thin serous fluid, sent for laboratory analysis. Procedure well-tolerated.    plan :  keep pt on ABX   moniter WBC

## 2020-07-06 NOTE — PROGRESS NOTE ADULT - SUBJECTIVE AND OBJECTIVE BOX
Patient Age: 59 y    Patient Gender:  Female    Procedure (including site / side if known):  Percutaneous, CT-directed drainage of pelvic fluid collection    Diagnosis / Indication:  Suspected diverticular abscess    Interventional Radiology Attending Physician:  Dr. Posada    Ordering Attending Physician:  Dr. Cramer    Pertinent Medical History:  Perforated diverticulitis    PAST MEDICAL & SURGICAL HISTORY:  Acquired hypothyroidism  H/O:   H/O thyroidectomy      Allergies:  Penicillin (Other)      Pertinent Labs:                        10.6   8.20  )-----------( 383      ( 2020 22:02 )             33.2       07-05    138  |  100  |  4<L>  ----------------------------<  97  3.7   |  25  |  0.5<L>    Ca    7.8<L>      2020 22:02  Phos  2.1     07-05  Mg     2.2     07-05      Consentable:  Yes    NPO:  Yes    Code Status: DNR [ ]  DNI [ ] Full Code [ ]     Patient and Family aware:   Yes      Risks, benefits, and alternatives to treatment discussed. All questions answered with understanding.    Please call [phone] with any further questions.

## 2020-07-06 NOTE — PROGRESS NOTE ADULT - SUBJECTIVE AND OBJECTIVE BOX
Progress Note: Surgery  Patient: DIONNE PETERSEN , 59y (1960)Female   MRN: [id number]  Location: [address]  Visit: 07-03-20 Inpatient  Date: 07-06-20 @ 01:20    Procedure/Diagnosis:  Events over 24h: No acute event overnight. No new complaint. Pt is hemodynamically stable. NPO, still having loose bowel movements. Pain in LLQ is improving. Denies nausea, vomiting, voiding adequately. Using Incentive spirometer and ICDs. Getting a CTAP w ORAL and IV contrast at 1am to further evaluate abscess formation.     Vitals: T(F): 97.6 (07-05-20 @ 21:19), Max: 98.5 (07-05-20 @ 05:11)  HR: 77 (07-05-20 @ 21:19)  BP: 108/58 (07-05-20 @ 21:19) (108/58 - 124/67)  RR: 18 (07-05-20 @ 21:19)  SpO2: 96% (07-05-20 @ 13:27)      Diet: Diet, NPO:   Except Medications  With Chewing Gum  With Hard Candy  With Ice Chips/Sips of Water     Special Instructions for Nursing:  Except Medications (07-05-20 @ 08:27)    IV Fluid: dextrose 5% + sodium chloride 0.45% with potassium chloride 10 mEq/L 1000 milliLiter(s) (125 mL/Hr) IV Continuous <Continuous>      In:   07-04-20 @ 07:01  -  07-05-20 @ 07:00  --------------------------------------------------------  IN: 0 mL    07-05-20 @ 07:01  -  07-06-20 @ 01:20  --------------------------------------------------------  IN: 1825 mL      Out:   07-04-20 @ 07:01  -  07-05-20 @ 07:00  --------------------------------------------------------  OUT:    Voided: 400 mL  Total OUT: 400 mL      07-05-20 @ 07:01  -  07-06-20 @ 01:20  --------------------------------------------------------  OUT:    Voided: 500 mL  Total OUT: 500 mL        Net:   07-04-20 @ 07:01  -  07-05-20 @ 07:00  --------------------------------------------------------  NET: -400 mL    07-05-20 @ 07:01  -  07-06-20 @ 01:20  --------------------------------------------------------  NET: 1325 mL        Physical Examination:  General Appearance: NAD, alert and cooperative  Heart: S1 and S2. No murmurs. Rhythm is regular.  Lungs: Clear to auscultation BL without rales, rhonchi, wheezing, crackles or diminished breath sounds.  Abdomen: Positive bowel sounds. Soft, nondistended,No rigidity, guarding, or rebound tenderness.       Medications: [Standing]  atorvastatin 20 milliGRAM(s) Oral at bedtime  chlorhexidine 2% Cloths 1 Application(s) Topical <User Schedule>  ciprofloxacin   IVPB 400 milliGRAM(s) IV Intermittent every 12 hours  dextrose 5% + sodium chloride 0.45% with potassium chloride 10 mEq/L 1000 milliLiter(s) (125 mL/Hr) IV Continuous <Continuous>  heparin   Injectable 5000 Unit(s) SubCutaneous every 8 hours  levothyroxine 200 MICROGram(s) Oral daily  metroNIDAZOLE  IVPB 500 milliGRAM(s) IV Intermittent every 8 hours  pantoprazole  Injectable 40 milliGRAM(s) IV Push daily    Medications:[PRN]  acetaminophen   Tablet .. 650 milliGRAM(s) Oral every 6 hours PRN  ondansetron  IVPB 4 milliGRAM(s) IV Intermittent every 8 hours PRN    Labs:                        10.6   8.20  )-----------( 383      ( 05 Jul 2020 22:02 )             33.2   07-05    138  |  100  |  4<L>  ----------------------------<  97  3.7   |  25  |  0.5<L>    Ca    7.8<L>      05 Jul 2020 22:02  Phos  2.1     07-05  Mg     2.2     07-05    PT/INR - ( 04 Jul 2020 05:20 )   PT: 15.20 sec;   INR: 1.32 ratio         PTT - ( 04 Jul 2020 05:20 )  PTT:24.8 sec    Micro/Urine:    Imaging:  None/24h

## 2020-07-06 NOTE — PROGRESS NOTE ADULT - SUBJECTIVE AND OBJECTIVE BOX
INTERVENTIONAL RADIOLOGY BRIEF-OPERATIVE NOTE    Procedure: CT-guided drainage of pelvic fluid collection with conscious sedation    Pre-Op Diagnosis: Pelvic collection    Post-Op Diagnosis: Same    Attending: Jose Posada MD  Resident: Eloy Avilez MD    Anesthesia (type):  [ ] General Anesthesia  [x] Sedation - 1 mg Versed, 50 mcg fentanyl  [ ] Spinal Anesthesia  [x] Local/Regional    Contrast: None    Estimated Blood Loss: Minimal, < 5 cc    Condition:   [ ] Critical  [ ] Serious  [ ] Fair   [x] Good    Findings/Follow up Plan of Care: Status post image-guided placement of an 8F pigtail drainage cathter into a pelvic fluid collection with removal of 60cc thin serous fluid, sent for laboratory analysis. Procedure well-tolerated.    Specimens Removed: None.    Implants: None.    Complications: None immediate.    Disposition: Anticipate return to previous level of care following short interval monitoring in recovery,      Please call Interventional Radiology [phone] with any questions, concerns, or issues. INTERVENTIONAL RADIOLOGY BRIEF-OPERATIVE NOTE    Procedure: CT-guided drainage of pelvic fluid collection with conscious sedation    Pre-Op Diagnosis: Pelvic collection    Post-Op Diagnosis: Same    Attending: Jose Posada MD  Resident: Eloy Avilez MD    Anesthesia (type):  [ ] General Anesthesia  [x] Sedation - 1 mg Versed, 50 mcg fentanyl  [ ] Spinal Anesthesia  [x] Local/Regional    Total Face-to-Face Sedation Time:  58 minutes    Contrast: None    Estimated Blood Loss: Minimal, < 5 cc    Condition:   [ ] Critical  [ ] Serious  [ ] Fair   [x] Good    Findings/Follow up Plan of Care: Status post image-guided placement of an 8F pigtail drainage cathter into a pelvic fluid collection with removal of 60cc thin serous fluid, sent for laboratory analysis. Procedure well-tolerated.    Specimens Removed: None.    Implants: None.    Complications: None immediate.    Disposition: Anticipate return to previous level of care following short interval monitoring in recovery,      Please call Interventional Radiology [phone] with any questions, concerns, or issues.

## 2020-07-07 VITALS
RESPIRATION RATE: 18 BRPM | OXYGEN SATURATION: 95 % | SYSTOLIC BLOOD PRESSURE: 132 MMHG | HEART RATE: 90 BPM | DIASTOLIC BLOOD PRESSURE: 74 MMHG | TEMPERATURE: 99 F

## 2020-07-07 LAB
ANION GAP SERPL CALC-SCNC: 14 MMOL/L — SIGNIFICANT CHANGE UP (ref 7–14)
BUN SERPL-MCNC: <3 MG/DL — LOW (ref 10–20)
CALCIUM SERPL-MCNC: 7.9 MG/DL — LOW (ref 8.5–10.1)
CHLORIDE SERPL-SCNC: 99 MMOL/L — SIGNIFICANT CHANGE UP (ref 98–110)
CO2 SERPL-SCNC: 27 MMOL/L — SIGNIFICANT CHANGE UP (ref 17–32)
CREAT SERPL-MCNC: 0.5 MG/DL — LOW (ref 0.7–1.5)
GLUCOSE SERPL-MCNC: 106 MG/DL — HIGH (ref 70–99)
GRAM STN FLD: SIGNIFICANT CHANGE UP
POTASSIUM SERPL-MCNC: 3.2 MMOL/L — LOW (ref 3.5–5)
POTASSIUM SERPL-SCNC: 3.2 MMOL/L — LOW (ref 3.5–5)
SODIUM SERPL-SCNC: 140 MMOL/L — SIGNIFICANT CHANGE UP (ref 135–146)
SPECIMEN SOURCE: SIGNIFICANT CHANGE UP

## 2020-07-07 RX ORDER — METRONIDAZOLE 500 MG
1 TABLET ORAL
Qty: 42 | Refills: 0
Start: 2020-07-07 | End: 2020-07-20

## 2020-07-07 RX ORDER — POTASSIUM CHLORIDE 20 MEQ
20 PACKET (EA) ORAL ONCE
Refills: 0 | Status: DISCONTINUED | OUTPATIENT
Start: 2020-07-07 | End: 2020-07-07

## 2020-07-07 RX ORDER — POTASSIUM CHLORIDE 20 MEQ
20 PACKET (EA) ORAL ONCE
Refills: 0 | Status: COMPLETED | OUTPATIENT
Start: 2020-07-07 | End: 2020-07-07

## 2020-07-07 RX ORDER — CIPROFLOXACIN LACTATE 400MG/40ML
1 VIAL (ML) INTRAVENOUS
Qty: 28 | Refills: 0
Start: 2020-07-07 | End: 2020-07-20

## 2020-07-07 RX ORDER — ACETAMINOPHEN 500 MG
2 TABLET ORAL
Qty: 0 | Refills: 0 | DISCHARGE
Start: 2020-07-07

## 2020-07-07 RX ADMIN — Medication 50 MILLIEQUIVALENT(S): at 10:53

## 2020-07-07 RX ADMIN — Medication 200 MICROGRAM(S): at 05:34

## 2020-07-07 RX ADMIN — Medication 100 MILLIGRAM(S): at 05:34

## 2020-07-07 RX ADMIN — DEXTROSE MONOHYDRATE, SODIUM CHLORIDE, AND POTASSIUM CHLORIDE 75 MILLILITER(S): 50; .745; 4.5 INJECTION, SOLUTION INTRAVENOUS at 05:35

## 2020-07-07 RX ADMIN — Medication 200 MILLIGRAM(S): at 05:33

## 2020-07-07 RX ADMIN — HEPARIN SODIUM 5000 UNIT(S): 5000 INJECTION INTRAVENOUS; SUBCUTANEOUS at 05:35

## 2020-07-07 RX ADMIN — PANTOPRAZOLE SODIUM 40 MILLIGRAM(S): 20 TABLET, DELAYED RELEASE ORAL at 11:18

## 2020-07-07 NOTE — DISCHARGE NOTE PROVIDER - CARE PROVIDER_API CALL
Abhay Aguilar  Surgery  [address] [place] [address]  Phone: [phone]  Fax: [phone]  Follow Up Time: 2 weeks

## 2020-07-07 NOTE — DISCHARGE NOTE PROVIDER - HOSPITAL COURSE
DIONNE PETERSEN     59F w/ PMH/PSH of thyroidectomy 2005 and radiation for thyroid neoplasm. Presents today w/ ABD pain for 5 days, located in the LLQ. Nonradiating. Associated with f/c and n/v 2 days ago. Never experienced before, no exacerbating/relieving fx. Pt denies CP, SOB, diarrhea, fever, chills, urinary symptoms. Last BM this AM, normal. Last c-scope 11 years ago, reportedly normal. CT scan Abd/pelv showed Findings consistent with perforated sigmoid diverticulitis with free air and 5.6 cm deep pelvic partially rim-enhancing early abscess.    Patient went for Interventional Radiology to get CT-guided drainage of pelvic fluid collection with placement of an 8F pigtail drainage cathter into a pelvic fluid collection with removal of 60cc thin serous fluid, sent for laboratory analysis. Procedure well-tolerated. The patient tolerated soft diet and her pain is well controlled                ---    HOSPITAL COURSE:         Patient was medically optimized and improved clinically throughout hospital course. Patient seen and examined on day of discharge.        Vital Signs    T(C): 36.4 (07 Jul 2020 06:12), Max: 36.7 (06 Jul 2020 14:27)    T(F): 97.6 (07 Jul 2020 06:12), Max: 98.1 (06 Jul 2020 21:18)    HR: 80 (07 Jul 2020 06:12) (80 - 83)    BP: 110/67 (07 Jul 2020 06:12) (110/67 - 149/74)    RR: 17 (07 Jul 2020 06:12) (17 - 18)    SpO2: 99% (07 Jul 2020 06:12) (99% - 99%)        Physical Exam:    General: well-developed, well-nourished, NAD    Neck: supple, non-tender, no masses    Neurology: AAOx3, sensation intact    Respiratory: clear to auscultation bilaterally; no wheezes, rhonchi, or rales    CV: regular rate and rhythm, soft S1/S2, no murmurs, rubs, or gallops    Abdominal: soft, non-tender, non-distended, bowel sounds present     Skin: warm, dry, normal color        Patient is medically stable for discharge to ____ with outpatient follow up.    ---    CONSULTANTS:         ---    FINAL DISCHARGE DIAGNOSIS LIST:    Please see last daily progress note for final discharge diagnoses DIONNE PETERSEN     59F w/ PMH/PSH of thyroidectomy 2005 and radiation for thyroid neoplasm. Presents today w/ ABD pain for 5 days, located in the LLQ. Nonradiating. Associated with f/c and n/v 2 days ago. Never experienced before, no exacerbating/relieving fx. Pt denies CP, SOB, diarrhea, fever, chills, urinary symptoms. Last BM this AM, normal. Last c-scope 11 years ago, reportedly normal. CT scan Abd/pelv showed Findings consistent with perforated sigmoid diverticulitis with free air and 5.6 cm deep pelvic partially rim-enhancing early abscess.    Patient went for Interventional Radiology to get CT-guided drainage of pelvic fluid collection with placement of an 8F pigtail drainage cathter into a pelvic fluid collection with removal of 60cc thin serous fluid, sent for laboratory analysis. Procedure well-tolerated. The patient tolerated soft diet and her pain is well controlled. The patient will discharged to follow up with Dr. Aguilar on Tuesday 7/14/2020.                ---    HOSPITAL COURSE:         Patient was medically optimized and improved clinically throughout hospital course. Patient seen and examined on day of discharge.        Vital Signs    T(C): 36.4 (07 Jul 2020 06:12), Max: 36.7 (06 Jul 2020 14:27)    T(F): 97.6 (07 Jul 2020 06:12), Max: 98.1 (06 Jul 2020 21:18)    HR: 80 (07 Jul 2020 06:12) (80 - 83)    BP: 110/67 (07 Jul 2020 06:12) (110/67 - 149/74)    RR: 17 (07 Jul 2020 06:12) (17 - 18)    SpO2: 99% (07 Jul 2020 06:12) (99% - 99%)        Physical Exam:    General: well-developed, well-nourished, NAD    Neck: supple, non-tender, no masses    Neurology: AAOx3, sensation intact    Respiratory: clear to auscultation bilaterally; no wheezes, rhonchi, or rales    CV: regular rate and rhythm, soft S1/S2, no murmurs, rubs, or gallops    Abdominal: soft, non-tender, non-distended, bowel sounds present     Skin: warm, dry, normal color        Patient is medically stable for discharge to Home with outpatient follow up.

## 2020-07-07 NOTE — DISCHARGE NOTE PROVIDER - NSDCMRMEDTOKEN_GEN_ALL_CORE_FT
acetaminophen 325 mg oral tablet: 2 tab(s) orally every 6 hours, As needed, Temp greater or equal to 38C (100.4F), Mild Pain (1 - 3)  levothyroxine 200 mcg (0.2 mg) oral tablet: 1 tab(s) orally once a day  Lipitor 20 mg oral tablet: 1 tab(s) orally once a day

## 2020-07-07 NOTE — PROGRESS NOTE ADULT - SUBJECTIVE AND OBJECTIVE BOX
Progress Note: Surgery  Patient: DIONNE PETERSEN , 59y (1960)Female   MRN: [id number]  Location: [address]  Visit: 07-03-20 Inpatient  Date: 07-06-20 @ 01:20    Procedure/Diagnosis:  Events over 24h: No acute event overnight. No new complaint. Patient is s/p IR drainage of pelvic fluid collection POD:0. Patient is hemodynamically stable. Tolerating clear liquid diet, advanced to soft. Pain in LLQ is improving, drain in place with serosanguinous output. Denies nausea, vomiting, voiding adequately. Using Incentive spirometer and ICDs.    Vitals: T(F): 97.6 (07-05-20 @ 21:19), Max: 98.5 (07-05-20 @ 05:11)  HR: 77 (07-05-20 @ 21:19)  BP: 108/58 (07-05-20 @ 21:19) (108/58 - 124/67)  RR: 18 (07-05-20 @ 21:19)  SpO2: 96% (07-05-20 @ 13:27)      Diet: Soft  IV Fluid: dextrose 5% + sodium chloride 0.45% with potassium chloride 10 mEq/L 1000 milliLiter(s) (125 mL/Hr) IV Continuous <Continuous>    In:   07-04-20 @ 07:01  -  07-05-20 @ 07:00  --------------------------------------------------------  IN: 0 mL    07-05-20 @ 07:01  -  07-06-20 @ 01:20  --------------------------------------------------------  IN: 1825 mL      Out:   07-04-20 @ 07:01  -  07-05-20 @ 07:00  --------------------------------------------------------  OUT:    Voided: 400 mL  Total OUT: 400 mL      07-05-20 @ 07:01  -  07-06-20 @ 01:20  --------------------------------------------------------  OUT:    Voided: 500 mL  Total OUT: 500 mL        Net:   07-04-20 @ 07:01  -  07-05-20 @ 07:00  --------------------------------------------------------  NET: -400 mL    07-05-20 @ 07:01  -  07-06-20 @ 01:20  --------------------------------------------------------  NET: 1325 mL        Physical Examination:  General Appearance: NAD, alert and cooperative, laying in bed comfortably  Heart: S1 and S2. No murmurs. Rhythm is regular.  Lungs: Clear to auscultation BL without rales, rhonchi, wheezing, crackles or diminished breath sounds.  Abdomen: Positive bowel sounds. Soft, nondistended, No rigidity, guarding, or rebound tenderness, drain in place with serosanguinous output      Medications: [Standing]  atorvastatin 20 milliGRAM(s) Oral at bedtime  chlorhexidine 2% Cloths 1 Application(s) Topical <User Schedule>  ciprofloxacin   IVPB 400 milliGRAM(s) IV Intermittent every 12 hours  dextrose 5% + sodium chloride 0.45% with potassium chloride 10 mEq/L 1000 milliLiter(s) (125 mL/Hr) IV Continuous <Continuous>  heparin   Injectable 5000 Unit(s) SubCutaneous every 8 hours  levothyroxine 200 MICROGram(s) Oral daily  metroNIDAZOLE  IVPB 500 milliGRAM(s) IV Intermittent every 8 hours  pantoprazole  Injectable 40 milliGRAM(s) IV Push daily    Medications:[PRN]  acetaminophen   Tablet .. 650 milliGRAM(s) Oral every 6 hours PRN  ondansetron  IVPB 4 milliGRAM(s) IV Intermittent every 8 hours PRN    Labs:                        10.6   8.20  )-----------( 383      ( 05 Jul 2020 22:02 )             33.2   07-05    138  |  100  |  4<L>  ----------------------------<  97  3.7   |  25  |  0.5<L>    Ca    7.8<L>      05 Jul 2020 22:02  Phos  2.1     07-05  Mg     2.2     07-05    PT/INR - ( 04 Jul 2020 05:20 )   PT: 15.20 sec;   INR: 1.32 ratio         PTT - ( 04 Jul 2020 05:20 )  PTT:24.8 sec    Micro/Urine:    Imaging:  None/24h

## 2020-07-07 NOTE — PROGRESS NOTE ADULT - REASON FOR ADMISSION
Perforated diverticulitis

## 2020-07-07 NOTE — DISCHARGE NOTE PROVIDER - NSDCCPTREATMENT_GEN_ALL_CORE_FT
PRINCIPAL PROCEDURE  Procedure: CT guided drainage of abscess of abdomen  Findings and Treatment: PRINCIPAL PROCEDURE  Procedure: CT guided drainage of abscess of abdomen  Findings and Treatment: You are being discharged from [facility]. Please call to schedule a follow up appointment with Dr. Aguilar as previously discussed next Tuesday 7/14/20. You have been prescribed Antibiotic medications and your home medications, please take as directed. . Please avoid heavy weight lifting for the next 4-6 weeks.  [facility] home care will be sending you nurse to help and teach you about drain care and and recording the output.    If you have any further questions about your care, please do not hesitate to contact Dr. Aguilar's office or return to the Emergency Department.

## 2020-07-07 NOTE — DISCHARGE NOTE PROVIDER - NSDCCPCAREPLAN_GEN_ALL_CORE_FT
PRINCIPAL DISCHARGE DIAGNOSIS  Diagnosis: Diverticulitis of sigmoid colon  Assessment and Plan of Treatment:       SECONDARY DISCHARGE DIAGNOSES  Diagnosis: Perforation and abscess of large intestine concurrent with and due to diverticulitis  Assessment and Plan of Treatment:     Diagnosis: Abscess  Assessment and Plan of Treatment:

## 2020-07-07 NOTE — PROGRESS NOTE ADULT - ASSESSMENT
ASSESSMENT AND PLAN    59F   w/ ABD pain for 5 days, located in the LLQ. Nonradiating. Associated with f/c and n/v 2 days ago. Never experienced before, no exacerbating/relieving fx. Pt denies CP, SOB, diarrhea, fever, chills, urinary symptoms. Last BM this AM, normal. Last c-scope 11 years ago, reportedly normal. CT scan Abd/pelv showed Findings consistent with perforated sigmoid diverticulitis with free air and 5.6 cm deep pelvic partially rim-enhancing early abscess.. Abnormally dilated small bowel, likely reactive ileus.      Plan:  -NPO  -IVF  -ATB (ciprofloxacin/metronidazole)  -Electrolytes imbalance corrections  -Image guided percutaneous drainage
Assessment:  59y Female patient admitted with complicated diverticulitis likely with an abscess in the sigmoid colon , with the above physical exam, labs, and imaging findings.    Plan:  - f/u CTAP  - f/u IR consult after CTAP  - c/w iv abx  - c/w ivf, npo  -Pain control as needed  -Hemodynamic monitoring as per routine  -Encourage ambulation and incentive spirometer use (10x/hr when awake)  -GI and DVT prophylaxis  -Check and replete CBC and BMP q daily  -Strict input and output monitoring  -Continue current management    Date/Time: 07-06-20 @ 01:20
Assessment:  59y Female patient admitted with complicated diverticulitis likely with an abscess in the sigmoid colon , with the above physical exam, labs, and imaging findings. Patient is s/p IR drainage of pelvic fluid collection, LEELA drain left.    Plan:  - Continue to flush LEELA drain  - Tolerating CLD, advanced to soft  - Plan for discharge with outpatient Cipro/Flagyl x2 weeks  - Pain control as needed  - Hemodynamic monitoring as per routine  - Encourage ambulation and incentive spirometer use (10x/hr when awake)  - GI and DVT prophylaxis  - Check and replete CBC and BMP q daily  - Strict input and output monitoring  - Continue current management

## 2020-07-07 NOTE — DISCHARGE NOTE NURSING/CASE MANAGEMENT/SOCIAL WORK - PATIENT PORTAL LINK FT
You can access the FollowMyHealth Patient Portal offered by [facility] by registering at the following website: [email or web address]. By joining [email or web address] FollowMyHealth portal, you will also be able to view your health information using other applications (apps) compatible with our system.

## 2020-07-07 NOTE — PROGRESS NOTE ADULT - ATTENDING COMMENTS
above noted abdomen soft no distension awaiting decision by IR
above noted abdomen soft no distension catheter in place will follow pt as out pt
above nmoted abdomen soft no distension tender LLQ pt examined by me on 7/6/20 for repeat ct scan and drainage by IR

## 2020-07-09 DIAGNOSIS — E87.6 HYPOKALEMIA: ICD-10-CM

## 2020-07-09 DIAGNOSIS — E83.42 HYPOMAGNESEMIA: ICD-10-CM

## 2020-07-09 DIAGNOSIS — K57.20 DIVERTICULITIS OF LARGE INTESTINE WITH PERFORATION AND ABSCESS WITHOUT BLEEDING: ICD-10-CM

## 2020-07-09 DIAGNOSIS — E03.9 HYPOTHYROIDISM, UNSPECIFIED: ICD-10-CM

## 2020-07-20 ENCOUNTER — EMERGENCY (EMERGENCY)
Facility: HOSPITAL | Age: 60
LOS: 0 days | Discharge: HOME | End: 2020-07-20
Attending: EMERGENCY MEDICINE | Admitting: EMERGENCY MEDICINE
Payer: COMMERCIAL

## 2020-07-20 VITALS — SYSTOLIC BLOOD PRESSURE: 150 MMHG | DIASTOLIC BLOOD PRESSURE: 79 MMHG | TEMPERATURE: 98 F | HEART RATE: 104 BPM

## 2020-07-20 VITALS
HEART RATE: 105 BPM | RESPIRATION RATE: 18 BRPM | DIASTOLIC BLOOD PRESSURE: 75 MMHG | TEMPERATURE: 98 F | OXYGEN SATURATION: 96 % | SYSTOLIC BLOOD PRESSURE: 148 MMHG

## 2020-07-20 DIAGNOSIS — Z00.00 ENCOUNTER FOR GENERAL ADULT MEDICAL EXAMINATION WITHOUT ABNORMAL FINDINGS: ICD-10-CM

## 2020-07-20 DIAGNOSIS — Z98.891 HISTORY OF UTERINE SCAR FROM PREVIOUS SURGERY: Chronic | ICD-10-CM

## 2020-07-20 DIAGNOSIS — R10.9 UNSPECIFIED ABDOMINAL PAIN: ICD-10-CM

## 2020-07-20 DIAGNOSIS — Z90.09 ACQUIRED ABSENCE OF OTHER PART OF HEAD AND NECK: Chronic | ICD-10-CM

## 2020-07-20 DIAGNOSIS — Z88.0 ALLERGY STATUS TO PENICILLIN: ICD-10-CM

## 2020-07-20 PROBLEM — E03.9 HYPOTHYROIDISM, UNSPECIFIED: Chronic | Status: ACTIVE | Noted: 2020-07-03

## 2020-07-20 LAB
ALBUMIN SERPL ELPH-MCNC: 3.3 G/DL — LOW (ref 3.5–5.2)
ALP SERPL-CCNC: 110 U/L — SIGNIFICANT CHANGE UP (ref 30–115)
ALT FLD-CCNC: 9 U/L — SIGNIFICANT CHANGE UP (ref 0–41)
ANION GAP SERPL CALC-SCNC: 12 MMOL/L — SIGNIFICANT CHANGE UP (ref 7–14)
AST SERPL-CCNC: 9 U/L — SIGNIFICANT CHANGE UP (ref 0–41)
BASOPHILS # BLD AUTO: 0.04 K/UL — SIGNIFICANT CHANGE UP (ref 0–0.2)
BASOPHILS NFR BLD AUTO: 0.4 % — SIGNIFICANT CHANGE UP (ref 0–1)
BILIRUB SERPL-MCNC: 0.5 MG/DL — SIGNIFICANT CHANGE UP (ref 0.2–1.2)
BUN SERPL-MCNC: 9 MG/DL — LOW (ref 10–20)
CALCIUM SERPL-MCNC: 8.5 MG/DL — SIGNIFICANT CHANGE UP (ref 8.5–10.1)
CHLORIDE SERPL-SCNC: 101 MMOL/L — SIGNIFICANT CHANGE UP (ref 98–110)
CO2 SERPL-SCNC: 25 MMOL/L — SIGNIFICANT CHANGE UP (ref 17–32)
CREAT SERPL-MCNC: 0.5 MG/DL — LOW (ref 0.7–1.5)
CULTURE RESULTS: SIGNIFICANT CHANGE UP
EOSINOPHIL # BLD AUTO: 0.08 K/UL — SIGNIFICANT CHANGE UP (ref 0–0.7)
EOSINOPHIL NFR BLD AUTO: 0.9 % — SIGNIFICANT CHANGE UP (ref 0–8)
GLUCOSE SERPL-MCNC: 132 MG/DL — HIGH (ref 70–99)
HCT VFR BLD CALC: 33 % — LOW (ref 37–47)
HGB BLD-MCNC: 10.8 G/DL — LOW (ref 12–16)
IMM GRANULOCYTES NFR BLD AUTO: 0.4 % — HIGH (ref 0.1–0.3)
LACTATE SERPL-SCNC: 1 MMOL/L — SIGNIFICANT CHANGE UP (ref 0.7–2)
LIDOCAIN IGE QN: 13 U/L — SIGNIFICANT CHANGE UP (ref 7–60)
LYMPHOCYTES # BLD AUTO: 1.57 K/UL — SIGNIFICANT CHANGE UP (ref 1.2–3.4)
LYMPHOCYTES # BLD AUTO: 17 % — LOW (ref 20.5–51.1)
MCHC RBC-ENTMCNC: 27.8 PG — SIGNIFICANT CHANGE UP (ref 27–31)
MCHC RBC-ENTMCNC: 32.7 G/DL — SIGNIFICANT CHANGE UP (ref 32–37)
MCV RBC AUTO: 84.8 FL — SIGNIFICANT CHANGE UP (ref 81–99)
MONOCYTES # BLD AUTO: 1.02 K/UL — HIGH (ref 0.1–0.6)
MONOCYTES NFR BLD AUTO: 11 % — HIGH (ref 1.7–9.3)
NEUTROPHILS # BLD AUTO: 6.49 K/UL — SIGNIFICANT CHANGE UP (ref 1.4–6.5)
NEUTROPHILS NFR BLD AUTO: 70.3 % — SIGNIFICANT CHANGE UP (ref 42.2–75.2)
NRBC # BLD: 0 /100 WBCS — SIGNIFICANT CHANGE UP (ref 0–0)
PLATELET # BLD AUTO: 390 K/UL — SIGNIFICANT CHANGE UP (ref 130–400)
POTASSIUM SERPL-MCNC: 3.7 MMOL/L — SIGNIFICANT CHANGE UP (ref 3.5–5)
POTASSIUM SERPL-SCNC: 3.7 MMOL/L — SIGNIFICANT CHANGE UP (ref 3.5–5)
PROT SERPL-MCNC: 6.1 G/DL — SIGNIFICANT CHANGE UP (ref 6–8)
RBC # BLD: 3.89 M/UL — LOW (ref 4.2–5.4)
RBC # FLD: 16.1 % — HIGH (ref 11.5–14.5)
SODIUM SERPL-SCNC: 138 MMOL/L — SIGNIFICANT CHANGE UP (ref 135–146)
SPECIMEN SOURCE: SIGNIFICANT CHANGE UP
WBC # BLD: 9.24 K/UL — SIGNIFICANT CHANGE UP (ref 4.8–10.8)
WBC # FLD AUTO: 9.24 K/UL — SIGNIFICANT CHANGE UP (ref 4.8–10.8)

## 2020-07-20 PROCEDURE — 74177 CT ABD & PELVIS W/CONTRAST: CPT | Mod: 26

## 2020-07-20 PROCEDURE — 99284 EMERGENCY DEPT VISIT MOD MDM: CPT

## 2020-07-20 RX ORDER — IOHEXOL 300 MG/ML
30 INJECTION, SOLUTION INTRAVENOUS ONCE
Refills: 0 | Status: COMPLETED | OUTPATIENT
Start: 2020-07-20 | End: 2020-07-20

## 2020-07-20 RX ADMIN — IOHEXOL 30 MILLILITER(S): 300 INJECTION, SOLUTION INTRAVENOUS at 12:03

## 2020-07-20 NOTE — ED ADULT TRIAGE NOTE - CHIEF COMPLAINT QUOTE
Pt sent in by PMD for lab work and ct scan. Pt has abscess drained from colon and has angelita drain placed 2 weeks ago. Pt c/o moderate drainage and redness from site.

## 2020-07-20 NOTE — ED PROVIDER NOTE - PROGRESS NOTE DETAILS
The patient was given detailed return precautions and advised to return to the emergency department if any new symptoms developed, symptoms worsened or for any concerns. The patient was offered the opportunity to ask questions and verbalized that they understand the diagnosis and discharge instructions.    Interval decrease in size of abscesses, labs well appearing. Pt is seeing Dr. Aguilar in office marisela. Provided copies of labs and imaging

## 2020-07-20 NOTE — ED PROVIDER NOTE - ATTENDING CONTRIBUTION TO CARE
52 y/o female h/o HTN, HLD, denies PSH c/o lower abdominal pain x approx 4 days, intermittent (lasting seconds), described as "pressure", denies radiation / modifying factors, denies fever, n/v/d, anorexia, cough, respiratory sx, change in bowel habits or urinary sx, vaginal d/c or other associated complaints at present.     No old chart available for review.  I have reviewed and agree with the nursing note, except as documented in my note.    VSS, awake, alert, non-toxic appearing, lying comfortably in stretcher, in NAD, no scleral icterus, oropharynx clear, mmm, no jaundice, skin rash or lesions, chest CTAB, non-labored breathing, no w/r/r, +S1/S2, RRR, no m/r/g, abdomen soft, minimal suprapubic ttp w/o peritoneal signs, ND, +BS, no hernias or distention, no pulsatile masses or bruits appreciated, no CVA tenderness, no peripheral edema or deformities, alert, clear speech and steady gait. 58 y/o female h/o hypothyroid, c/s, thyroidectomy, recent perforated diverticulitis (surgery: Lauren), s/p drain placed by IR (Al[facility]ino) on 7/6, now presents with drainage and redness from site x several days, sx are persistent, denies modifying factors, last bowel movement was days this AM (loose bm), passing stool and flatus, denies abdominal pain, fever, n/v/d, anorexia, cough, respiratory sx, change in bowel habits or urinary sx, vaginal d/c or other associated complaints at present.     Old chart reviewed.  I have reviewed and agree with the nursing note, except as documented in my note.    VSS, awake, alert, non-toxic appearing, lying comfortably in stretcher, in NAD, no scleral icterus, oropharynx clear, mmm, no jaundice, skin rash or lesions, chest CTAB, non-labored breathing, no w/r/r, +S1/S2, RRR, no m/r/g, abdomen soft, NT, ND, +BS, drain in place in left posterior lower back; site appears C/D/I, no distention, no pulsatile masses or bruits appreciated, no CVA tenderness, no peripheral edema or deformities, alert, clear speech and steady gait.

## 2020-07-20 NOTE — ED PROVIDER NOTE - NSFOLLOWUPINSTRUCTIONS_ED_ALL_ED_FT
Surgical Drain [facility]  Surgical drains are used to remove extra fluid that normally builds up in a surgical wound after surgery. A surgical drain helps to heal a surgical wound. Different kinds of surgical drains include:  Active drains. These drains use suction to pull drainage away from the surgical wound. Drainage flows through a tube to a container outside of the body. With these drains, you need to keep the bulb or the drainage container flat (compressed) at all times, except while you empty it. Flattening the bulb or container creates suction.Passive drains. These drains allow fluid to drain naturally, by gravity. Drainage flows through a tube to a bandage (dressing) or a container outside of the body. Passive drains do not need to be emptied.A drain is placed during surgery. Right after surgery, drainage is usually bright red and a little thicker than water. The drainage may gradually turn yellow or pink and become thinner. It is likely that your health care provider will remove the drain when the drainage stops or when the amount decreases to 1–2 Tbsp (15–30 mL) during a 24-hour period.  Supplies needed:  Tape.Germ-free cleaning solution (sterile saline).Cotton swabs.Split gauze drain sponge: 4 x 4 inches (10 x 10 cm).Gauze square: 4 x 4 inches (10 x 10 cm).How to care for your surgical drain  Care for your drain as told by your health care provider. This is important to help prevent infection. If your drain is placed at your back, or any other hard-to-reach area, ask another person to assist you in performing the following tasks:  General care     Keep the skin around the drain dry and covered with a dressing at all times.Check your drain area every day for signs of infection. Check for:  Redness, swelling, or pain.Pus or a bad smell.Cloudy drainage.Tenderness or pressure at the drain exit site.Changing the dressing     Follow instructions from your health care provider about how to change your dressing. Change your dressing at least once a day. Change it more often if needed to keep the dressing dry. Make sure you:  Gather your supplies.  Wash your hands with soap and water before you change your dressing. If soap and water are not available, use hand .  Remove the old dressing. Avoid using scissors to do that.  Wash your hands with soap and water again after removing the old dressing.  Use sterile saline to clean your skin around the drain. You may need to use a cotton swab to clean the skin.  Place the tube through the slit in a drain sponge. Place the drain sponge so that it covers your wound.  Place the gauze square or another drain sponge on top of the drain sponge that is on the wound. Make sure the tube is between those layers.  Tape the dressing to your skin.  Tape the drainage tube to your skin 1–2 inches (2.5–5 cm) below the place where the tube enters your body. Taping keeps the tube from pulling on any stitches (sutures) that you have.  Wash your hands with soap and water.  Write down the color of your drainage and how often you change your dressing.  How to empty your active drain     Make sure that you have a measuring cup that you can empty your drainage into.  Wash your hands with soap and water. If soap and water are not available, use hand .  Loosen any pins or clips that hold the tube in place.  If your health care provider tells you to strip the tube to prevent clots and tube blockages:  Hold the tube at the skin with one hand. Use your other hand to pinch the tubing with your thumb and first finger.Gently move your fingers down the tube while squeezing very lightly. This clears any drainage, clots, or tissue from the tube.You may need to do this several times each day to keep the tube clear. Do not pull on the tube.Open the bulb cap or the drain plug. Do not touch the inside of the cap or the bottom of the plug.  Turn the device upside down and gently squeeze.  Empty all of the drainage into the measuring cup.  Compress the bulb or the container and replace the cap or the plug. To compress the bulb or the container, squeeze it firmly in the middle while you close the cap or plug the container.  Write down the amount of drainage that you have in each 24-hour period. If you have less than 2 Tbsp (30 mL) of drainage during 24 hours, contact your health care provider.  Flush the drainage down the toilet.  Wash your hands with soap and water.  Contact a health care provider if:  You have redness, swelling, or pain around your drain area.You have pus or a bad smell coming from your drain area.You have a fever or chills.The skin around your drain is warm to the touch.The amount of drainage that you have is increasing instead of decreasing.You have drainage that is cloudy.There is a sudden stop or a sudden decrease in the amount of drainage that you have.Your drain tube falls out.Your active drain does not stay compressed after you empty it.Summary  Surgical drains are used to remove extra fluid that normally builds up in a surgical wound after surgery.Different kinds of surgical drains include active drains and passive drains. Active drains use suction to pull drainage away from the surgical wound, and passive drains allow fluid to drain naturally.It is important to care for your drain to prevent infection. If your drain is placed at your back, or any other hard-to-reach area, ask another person to assist you.Contact your health care provider if you have redness, swelling, or pain around your drain area.This information is not intended to replace advice given to you by your health care provider. Make sure you discuss any questions you have with your health care provider.

## 2020-07-20 NOTE — ED PROVIDER NOTE - CARE PROVIDER_API CALL
Abhay Aguilar  Surgery  [address] [place] [address]  Phone: [phone]  Fax: [phone]  Follow Up Time: 1-3 Days

## 2020-07-20 NOTE — ED ADULT NURSE NOTE - OBJECTIVE STATEMENT
pt A&ox3, pt states that she was sent by primary care doctor for ct and blood work/ pt has angelita drain in place on left side buttock. dsd CDI, pt states changed by visiting nurse this morning, pt denies pain. states that she has felt gassy for a few days tho. no s/s of acute distress at this time.

## 2020-07-20 NOTE — ED PROVIDER NOTE - OBJECTIVE STATEMENT
60yo female with PMHx hypothyroid, thyroidectomy, recent perforated diverticulitis (Lauren), s/p LEELA drain placed by IR (Albino) on 7/6, presents sent to ED for CT scan and labs at request of Dr. Aguilar. Pt reports she has not had any drainage from LEELA drain in 3-4 days. Was visited by VNS today and noted to have discharge around drain suture site with some redness. Pt is scheduled to see Dr. Aguilar tomorrow. Pt is without fever, chills, vomiting or diarrhea. Has been compliant with Cipro/Flagyl.

## 2020-07-20 NOTE — ED PROVIDER NOTE - NS ED ROS FT
CONST: No fever, chills or bodyaches  EYES: No pain, redness, drainage or visual changes.  ENT: No ear pain or discharge, nasal discharge or congestion. No sore throat  CARD: No chest pain, palpitations  RESP: No SOB, cough  GI: see HPI  MS: No joint pain, back pain or extremity pain/injury  SKIN: No rashes  NEURO: No headache, dizziness, paresthesias or LOC

## 2020-07-20 NOTE — ED PROVIDER NOTE - PHYSICAL EXAMINATION
CONST: Well appearing in NAD  EYES: PERRL, EOMI, Sclera and conjunctiva clear.   ENT: No nasal discharge.   NECK: Non-tender  CARD: Normal S1 S2; Normal rate and rhythm  RESP: Equal BS B/L, No wheezes, rhonchi or rales. No distress  GI: Soft, LEELA drain to L lower flank, mild serous drainage around suture site with macerated skin. No surrounding cellulitis   MS: Normal ROM in all extremities. No midline spinal tenderness.  SKIN: Warm, dry, no acute rashes. Good turgor  NEURO: A&Ox3, No focal deficits. Strength 5/5 with no sensory deficits. Steady gait

## 2020-07-20 NOTE — ED PROVIDER NOTE - PATIENT PORTAL LINK FT
You can access the FollowMyHealth Patient Portal offered by [facility] by registering at the following website: [email or web address]. By joining [facility]’s FollowMyHealth portal, you will also be able to view your health information using other applications (apps) compatible with our system.

## 2020-07-22 ENCOUNTER — INPATIENT (INPATIENT)
Facility: HOSPITAL | Age: 60
LOS: 2 days | Discharge: HOME HEALTH | End: 2020-07-25
Attending: SURGERY | Admitting: SURGERY
Payer: COMMERCIAL

## 2020-07-22 VITALS
SYSTOLIC BLOOD PRESSURE: 131 MMHG | TEMPERATURE: 99 F | OXYGEN SATURATION: 95 % | DIASTOLIC BLOOD PRESSURE: 70 MMHG | HEART RATE: 105 BPM | RESPIRATION RATE: 18 BRPM | WEIGHT: 241.85 LBS

## 2020-07-22 DIAGNOSIS — Z90.09 ACQUIRED ABSENCE OF OTHER PART OF HEAD AND NECK: Chronic | ICD-10-CM

## 2020-07-22 DIAGNOSIS — Z98.891 HISTORY OF UTERINE SCAR FROM PREVIOUS SURGERY: Chronic | ICD-10-CM

## 2020-07-22 LAB
ALBUMIN SERPL ELPH-MCNC: 3.7 G/DL — SIGNIFICANT CHANGE UP (ref 3.5–5.2)
ALP SERPL-CCNC: 105 U/L — SIGNIFICANT CHANGE UP (ref 30–115)
ALT FLD-CCNC: 8 U/L — SIGNIFICANT CHANGE UP (ref 0–41)
ANION GAP SERPL CALC-SCNC: 12 MMOL/L — SIGNIFICANT CHANGE UP (ref 7–14)
APTT BLD: 26 SEC — LOW (ref 27–39.2)
AST SERPL-CCNC: 9 U/L — SIGNIFICANT CHANGE UP (ref 0–41)
BASOPHILS # BLD AUTO: 0.04 K/UL — SIGNIFICANT CHANGE UP (ref 0–0.2)
BASOPHILS NFR BLD AUTO: 0.5 % — SIGNIFICANT CHANGE UP (ref 0–1)
BILIRUB SERPL-MCNC: 0.4 MG/DL — SIGNIFICANT CHANGE UP (ref 0.2–1.2)
BLD GP AB SCN SERPL QL: SIGNIFICANT CHANGE UP
BUN SERPL-MCNC: 11 MG/DL — SIGNIFICANT CHANGE UP (ref 10–20)
CALCIUM SERPL-MCNC: 8.8 MG/DL — SIGNIFICANT CHANGE UP (ref 8.5–10.1)
CHLORIDE SERPL-SCNC: 101 MMOL/L — SIGNIFICANT CHANGE UP (ref 98–110)
CO2 SERPL-SCNC: 27 MMOL/L — SIGNIFICANT CHANGE UP (ref 17–32)
CREAT SERPL-MCNC: 0.6 MG/DL — LOW (ref 0.7–1.5)
EOSINOPHIL # BLD AUTO: 0.11 K/UL — SIGNIFICANT CHANGE UP (ref 0–0.7)
EOSINOPHIL NFR BLD AUTO: 1.3 % — SIGNIFICANT CHANGE UP (ref 0–8)
GLUCOSE SERPL-MCNC: 119 MG/DL — HIGH (ref 70–99)
HCT VFR BLD CALC: 34.8 % — LOW (ref 37–47)
HGB BLD-MCNC: 11.2 G/DL — LOW (ref 12–16)
IMM GRANULOCYTES NFR BLD AUTO: 0.6 % — HIGH (ref 0.1–0.3)
INR BLD: 1.14 RATIO — SIGNIFICANT CHANGE UP (ref 0.65–1.3)
LACTATE SERPL-SCNC: 1 MMOL/L — SIGNIFICANT CHANGE UP (ref 0.7–2)
LYMPHOCYTES # BLD AUTO: 1.6 K/UL — SIGNIFICANT CHANGE UP (ref 1.2–3.4)
LYMPHOCYTES # BLD AUTO: 18.8 % — LOW (ref 20.5–51.1)
MCHC RBC-ENTMCNC: 27.7 PG — SIGNIFICANT CHANGE UP (ref 27–31)
MCHC RBC-ENTMCNC: 32.2 G/DL — SIGNIFICANT CHANGE UP (ref 32–37)
MCV RBC AUTO: 85.9 FL — SIGNIFICANT CHANGE UP (ref 81–99)
MONOCYTES # BLD AUTO: 0.97 K/UL — HIGH (ref 0.1–0.6)
MONOCYTES NFR BLD AUTO: 11.4 % — HIGH (ref 1.7–9.3)
NEUTROPHILS # BLD AUTO: 5.73 K/UL — SIGNIFICANT CHANGE UP (ref 1.4–6.5)
NEUTROPHILS NFR BLD AUTO: 67.4 % — SIGNIFICANT CHANGE UP (ref 42.2–75.2)
NRBC # BLD: 0 /100 WBCS — SIGNIFICANT CHANGE UP (ref 0–0)
PLATELET # BLD AUTO: 411 K/UL — HIGH (ref 130–400)
POTASSIUM SERPL-MCNC: 3.6 MMOL/L — SIGNIFICANT CHANGE UP (ref 3.5–5)
POTASSIUM SERPL-SCNC: 3.6 MMOL/L — SIGNIFICANT CHANGE UP (ref 3.5–5)
PROT SERPL-MCNC: 6.7 G/DL — SIGNIFICANT CHANGE UP (ref 6–8)
PROTHROM AB SERPL-ACNC: 13.1 SEC — HIGH (ref 9.95–12.87)
RBC # BLD: 4.05 M/UL — LOW (ref 4.2–5.4)
RBC # FLD: 15.9 % — HIGH (ref 11.5–14.5)
SODIUM SERPL-SCNC: 140 MMOL/L — SIGNIFICANT CHANGE UP (ref 135–146)
WBC # BLD: 8.5 K/UL — SIGNIFICANT CHANGE UP (ref 4.8–10.8)
WBC # FLD AUTO: 8.5 K/UL — SIGNIFICANT CHANGE UP (ref 4.8–10.8)

## 2020-07-22 PROCEDURE — 99285 EMERGENCY DEPT VISIT HI MDM: CPT

## 2020-07-22 RX ORDER — SODIUM CHLORIDE 9 MG/ML
1000 INJECTION, SOLUTION INTRAVENOUS
Refills: 0 | Status: DISCONTINUED | OUTPATIENT
Start: 2020-07-22 | End: 2020-07-23

## 2020-07-22 RX ORDER — METRONIDAZOLE 500 MG
500 TABLET ORAL ONCE
Refills: 0 | Status: COMPLETED | OUTPATIENT
Start: 2020-07-22 | End: 2020-07-22

## 2020-07-22 RX ORDER — LEVOTHYROXINE SODIUM 125 MCG
112 TABLET ORAL DAILY
Refills: 0 | Status: DISCONTINUED | OUTPATIENT
Start: 2020-07-22 | End: 2020-07-25

## 2020-07-22 RX ORDER — LEVOTHYROXINE SODIUM 125 MCG
1 TABLET ORAL
Qty: 0 | Refills: 0 | DISCHARGE

## 2020-07-22 RX ORDER — CIPROFLOXACIN LACTATE 400MG/40ML
400 VIAL (ML) INTRAVENOUS ONCE
Refills: 0 | Status: COMPLETED | OUTPATIENT
Start: 2020-07-22 | End: 2020-07-22

## 2020-07-22 RX ORDER — LEVOTHYROXINE SODIUM 125 MCG
2 TABLET ORAL
Qty: 0 | Refills: 0 | DISCHARGE

## 2020-07-22 RX ORDER — HEPARIN SODIUM 5000 [USP'U]/ML
5000 INJECTION INTRAVENOUS; SUBCUTANEOUS EVERY 8 HOURS
Refills: 0 | Status: DISCONTINUED | OUTPATIENT
Start: 2020-07-22 | End: 2020-07-25

## 2020-07-22 RX ORDER — METRONIDAZOLE 500 MG
500 TABLET ORAL EVERY 8 HOURS
Refills: 0 | Status: DISCONTINUED | OUTPATIENT
Start: 2020-07-22 | End: 2020-07-24

## 2020-07-22 RX ORDER — CIPROFLOXACIN LACTATE 400MG/40ML
400 VIAL (ML) INTRAVENOUS EVERY 12 HOURS
Refills: 0 | Status: DISCONTINUED | OUTPATIENT
Start: 2020-07-22 | End: 2020-07-23

## 2020-07-22 RX ORDER — PANTOPRAZOLE SODIUM 20 MG/1
40 TABLET, DELAYED RELEASE ORAL
Refills: 0 | Status: DISCONTINUED | OUTPATIENT
Start: 2020-07-22 | End: 2020-07-25

## 2020-07-22 RX ORDER — ATORVASTATIN CALCIUM 80 MG/1
1 TABLET, FILM COATED ORAL
Qty: 0 | Refills: 0 | DISCHARGE

## 2020-07-22 RX ORDER — ATORVASTATIN CALCIUM 80 MG/1
20 TABLET, FILM COATED ORAL AT BEDTIME
Refills: 0 | Status: DISCONTINUED | OUTPATIENT
Start: 2020-07-22 | End: 2020-07-25

## 2020-07-22 RX ADMIN — Medication 100 MILLIGRAM(S): at 14:55

## 2020-07-22 RX ADMIN — Medication 200 MILLIGRAM(S): at 15:47

## 2020-07-22 RX ADMIN — ATORVASTATIN CALCIUM 20 MILLIGRAM(S): 80 TABLET, FILM COATED ORAL at 22:39

## 2020-07-22 RX ADMIN — HEPARIN SODIUM 5000 UNIT(S): 5000 INJECTION INTRAVENOUS; SUBCUTANEOUS at 22:39

## 2020-07-22 RX ADMIN — SODIUM CHLORIDE 100 MILLILITER(S): 9 INJECTION, SOLUTION INTRAVENOUS at 22:46

## 2020-07-22 RX ADMIN — Medication 100 MILLIGRAM(S): at 22:37

## 2020-07-22 NOTE — ED PROVIDER NOTE - PHYSICAL EXAMINATION
GENERAL: NAD, well-developed obese F completely comfortable in bed  HEENT:  Atraumatic, Normocephalic; EOMI, PERRLA, conjunctiva pink, sclera white, Supple, short thick neck  CHEST/LUNG: Clear to auscultation bilaterally; No wheeze/crackles  HEART: Regular rate and rhythm; No murmurs  ABDOMEN: Soft, Nontender, Nondistended; Bowel sounds present  	- LEELA drain in L posterior upper gluteal area midscapular line; some scant pus around area w/o significant drainage/erythema; LEELA drain bulb empty  EXTREMITIES:  2+ Peripheral Pulses, No peripheral pitting edema  PSYCH: AAOx3  NEUROLOGY: non-focal  SKIN: No rashes/lesions appreciated

## 2020-07-22 NOTE — H&P ADULT - ASSESSMENT
59F with perforated diverticulitis with abscess s/p IR drainage now requiring drain replacement to finish draining collection     Plan:   Admit to Surgery   NPO  IVF  IV cipro flagyl   ID consult

## 2020-07-22 NOTE — H&P ADULT - HISTORY OF PRESENT ILLNESS
Patient is a 59 year old female with a PMH of acute perforated diverticulitis with abscess s/p IR drainage and PSH of thyroidectomy 2005 and radiation for thyroid neoplasm. She is presenting the ED after being sent in by her surgeon jocelin she had a follow up CT that showed the abscess was small but the drain was not in the best position anymore to finish draining the rest of the collection. She has mild LLQ pain but is otherwise doing well.

## 2020-07-22 NOTE — ED PROVIDER NOTE - OBJECTIVE STATEMENT
60y/o F with PMH of hypothyroidism, HLD with recent perforated diverticulitis s/p IR drain 7/6 presents back to ED after being told to return by Dr. Aguilar after CT scan on 7/20 still demonstrated abscess and she had no drainage for several days. As per pt she was told she might need some further procedure. 60y/o F with PMH of hypothyroidism, HLD with recent perforated diverticulitis s/p IR drain 7/6 presents back to ED after being told to return by Dr. Aguilar after CT scan on 7/20 still demonstrated abscess and she had no drainage for several days. As per pt she was told she might need some further procedure. Has no current symptoms - no CP, palpitations, SOB, abd pain, N/V/C, blood in stool, fever/chills, inc pustular output from site or in LEELA drain. LEELA drain has not put out anything for last 3-5d. However eating well, having soft stools. Has been taking PO metronidazole/cipro for last 2wks.

## 2020-07-22 NOTE — ED PROVIDER NOTE - CLINICAL SUMMARY MEDICAL DECISION MAKING FREE TEXT BOX
pt with recent admission for perforated diverticulitis/abscess with LEELA drain in place, in ER with decreased drainage from drain.  no f/c.  no abd pain.  ct from 2 days ago with decreased size of abscess.  sent in for admission to Dr. Aguilar, for possible drain repositioning/replacement.

## 2020-07-22 NOTE — H&P ADULT - ATTENDING COMMENTS
above noted abdomen soft no distension discussed case with surgical resident discussed case with IR for drainage

## 2020-07-22 NOTE — ED PROVIDER NOTE - ATTENDING CONTRIBUTION TO CARE
58 y/o female with h/o hld, hypothyroidism, recent admission for perforated diverticulitis with abscess, s/p LEELA drain placed by IR, sent to ER by Dr. Aguilar for persistent abscess.  Pt was seen in ER 2 days ago for decreased outpt from LEELA drain. labs were ok, ct abd with decreased size of intra-abdominal abscesses,  pt d/c'd home to f/u with Dr. Aguilar as outpt. pt still taking po cipro/flagyl. was told by Dr. Aguilar to return to ER for admission, for possible repositioning of LEELA drain.  pt denies any abd pain.  no f/c.  no n/v/d.  no cp/sob.  no ha/dizziness/loc.   PE -nad, nc/at, eomi, perrl, op - clear, cta b/l, no w/r/r, rrr, abd- soft, nt/nd, nabs, LEELA drain to L lower back/upper gluteal area with some purulent material on gauze, no outpt in drain, from x 4, A&O x 3, no focal neuro deficits.  -check labs, iv abx, surg eval.

## 2020-07-22 NOTE — H&P ADULT - NSHPPHYSICALEXAM_GEN_ALL_CORE
PHYSICAL EXAM:  GENERAL: NAD,   HEENT: Normocephalic, atraumatic  CHEST/LUNG: Bilateral breath sounds  HEART: Regular rate and rhythm  ABDOMEN: Soft, Nondistended, mildly tender

## 2020-07-22 NOTE — ED ADULT NURSE NOTE - NSIMPLEMENTINTERV_GEN_ALL_ED
Implemented All Universal Safety Interventions:  Embarrass to call system. Call bell, personal items and telephone within reach. Instruct patient to call for assistance. Room bathroom lighting operational. Non-slip footwear when patient is off stretcher. Physically safe environment: no spills, clutter or unnecessary equipment. Stretcher in lowest position, wheels locked, appropriate side rails in place.

## 2020-07-23 LAB
ANION GAP SERPL CALC-SCNC: 11 MMOL/L — SIGNIFICANT CHANGE UP (ref 7–14)
ANION GAP SERPL CALC-SCNC: 11 MMOL/L — SIGNIFICANT CHANGE UP (ref 7–14)
APTT BLD: 27.9 SEC — SIGNIFICANT CHANGE UP (ref 27–39.2)
BLD GP AB SCN SERPL QL: SIGNIFICANT CHANGE UP
BUN SERPL-MCNC: 7 MG/DL — LOW (ref 10–20)
BUN SERPL-MCNC: 7 MG/DL — LOW (ref 10–20)
CALCIUM SERPL-MCNC: 8.3 MG/DL — LOW (ref 8.5–10.1)
CALCIUM SERPL-MCNC: 8.3 MG/DL — LOW (ref 8.5–10.1)
CHLORIDE SERPL-SCNC: 101 MMOL/L — SIGNIFICANT CHANGE UP (ref 98–110)
CHLORIDE SERPL-SCNC: 103 MMOL/L — SIGNIFICANT CHANGE UP (ref 98–110)
CO2 SERPL-SCNC: 26 MMOL/L — SIGNIFICANT CHANGE UP (ref 17–32)
CO2 SERPL-SCNC: 26 MMOL/L — SIGNIFICANT CHANGE UP (ref 17–32)
CREAT SERPL-MCNC: 0.6 MG/DL — LOW (ref 0.7–1.5)
CREAT SERPL-MCNC: <0.5 MG/DL — LOW (ref 0.7–1.5)
GLUCOSE SERPL-MCNC: 101 MG/DL — HIGH (ref 70–99)
GLUCOSE SERPL-MCNC: 107 MG/DL — HIGH (ref 70–99)
HCT VFR BLD CALC: 31.4 % — LOW (ref 37–47)
HCT VFR BLD CALC: 32.1 % — LOW (ref 37–47)
HGB BLD-MCNC: 10 G/DL — LOW (ref 12–16)
HGB BLD-MCNC: 10.1 G/DL — LOW (ref 12–16)
INR BLD: 1.15 RATIO — SIGNIFICANT CHANGE UP (ref 0.65–1.3)
MAGNESIUM SERPL-MCNC: 2.2 MG/DL — SIGNIFICANT CHANGE UP (ref 1.8–2.4)
MAGNESIUM SERPL-MCNC: 2.2 MG/DL — SIGNIFICANT CHANGE UP (ref 1.8–2.4)
MCHC RBC-ENTMCNC: 26.6 PG — LOW (ref 27–31)
MCHC RBC-ENTMCNC: 27.1 PG — SIGNIFICANT CHANGE UP (ref 27–31)
MCHC RBC-ENTMCNC: 31.2 G/DL — LOW (ref 32–37)
MCHC RBC-ENTMCNC: 32.2 G/DL — SIGNIFICANT CHANGE UP (ref 32–37)
MCV RBC AUTO: 84.2 FL — SIGNIFICANT CHANGE UP (ref 81–99)
MCV RBC AUTO: 85.4 FL — SIGNIFICANT CHANGE UP (ref 81–99)
NRBC # BLD: 0 /100 WBCS — SIGNIFICANT CHANGE UP (ref 0–0)
NRBC # BLD: 0 /100 WBCS — SIGNIFICANT CHANGE UP (ref 0–0)
PHOSPHATE SERPL-MCNC: 3.3 MG/DL — SIGNIFICANT CHANGE UP (ref 2.1–4.9)
PLATELET # BLD AUTO: 356 K/UL — SIGNIFICANT CHANGE UP (ref 130–400)
PLATELET # BLD AUTO: 384 K/UL — SIGNIFICANT CHANGE UP (ref 130–400)
POTASSIUM SERPL-MCNC: 3.5 MMOL/L — SIGNIFICANT CHANGE UP (ref 3.5–5)
POTASSIUM SERPL-MCNC: 3.6 MMOL/L — SIGNIFICANT CHANGE UP (ref 3.5–5)
POTASSIUM SERPL-SCNC: 3.5 MMOL/L — SIGNIFICANT CHANGE UP (ref 3.5–5)
POTASSIUM SERPL-SCNC: 3.6 MMOL/L — SIGNIFICANT CHANGE UP (ref 3.5–5)
PROTHROM AB SERPL-ACNC: 13.2 SEC — HIGH (ref 9.95–12.87)
RBC # BLD: 3.73 M/UL — LOW (ref 4.2–5.4)
RBC # BLD: 3.76 M/UL — LOW (ref 4.2–5.4)
RBC # FLD: 15.9 % — HIGH (ref 11.5–14.5)
RBC # FLD: 15.9 % — HIGH (ref 11.5–14.5)
SARS-COV-2 RNA SPEC QL NAA+PROBE: SIGNIFICANT CHANGE UP
SODIUM SERPL-SCNC: 138 MMOL/L — SIGNIFICANT CHANGE UP (ref 135–146)
SODIUM SERPL-SCNC: 140 MMOL/L — SIGNIFICANT CHANGE UP (ref 135–146)
WBC # BLD: 7.61 K/UL — SIGNIFICANT CHANGE UP (ref 4.8–10.8)
WBC # BLD: 8.15 K/UL — SIGNIFICANT CHANGE UP (ref 4.8–10.8)
WBC # FLD AUTO: 7.61 K/UL — SIGNIFICANT CHANGE UP (ref 4.8–10.8)
WBC # FLD AUTO: 8.15 K/UL — SIGNIFICANT CHANGE UP (ref 4.8–10.8)

## 2020-07-23 PROCEDURE — 74177 CT ABD & PELVIS W/CONTRAST: CPT | Mod: 26

## 2020-07-23 RX ORDER — SODIUM CHLORIDE 9 MG/ML
1000 INJECTION, SOLUTION INTRAVENOUS
Refills: 0 | Status: DISCONTINUED | OUTPATIENT
Start: 2020-07-23 | End: 2020-07-23

## 2020-07-23 RX ORDER — ACETAMINOPHEN 500 MG
650 TABLET ORAL EVERY 6 HOURS
Refills: 0 | Status: DISCONTINUED | OUTPATIENT
Start: 2020-07-23 | End: 2020-07-25

## 2020-07-23 RX ORDER — CEFEPIME 1 G/1
2000 INJECTION, POWDER, FOR SOLUTION INTRAMUSCULAR; INTRAVENOUS EVERY 8 HOURS
Refills: 0 | Status: DISCONTINUED | OUTPATIENT
Start: 2020-07-23 | End: 2020-07-24

## 2020-07-23 RX ORDER — IOHEXOL 300 MG/ML
30 INJECTION, SOLUTION INTRAVENOUS ONCE
Refills: 0 | Status: COMPLETED | OUTPATIENT
Start: 2020-07-23 | End: 2020-07-23

## 2020-07-23 RX ADMIN — ATORVASTATIN CALCIUM 20 MILLIGRAM(S): 80 TABLET, FILM COATED ORAL at 22:05

## 2020-07-23 RX ADMIN — Medication 100 MILLIGRAM(S): at 05:29

## 2020-07-23 RX ADMIN — HEPARIN SODIUM 5000 UNIT(S): 5000 INJECTION INTRAVENOUS; SUBCUTANEOUS at 05:30

## 2020-07-23 RX ADMIN — Medication 200 MILLIGRAM(S): at 05:29

## 2020-07-23 RX ADMIN — IOHEXOL 30 MILLILITER(S): 300 INJECTION, SOLUTION INTRAVENOUS at 12:34

## 2020-07-23 RX ADMIN — HEPARIN SODIUM 5000 UNIT(S): 5000 INJECTION INTRAVENOUS; SUBCUTANEOUS at 13:26

## 2020-07-23 RX ADMIN — SODIUM CHLORIDE 100 MILLILITER(S): 9 INJECTION, SOLUTION INTRAVENOUS at 13:24

## 2020-07-23 RX ADMIN — PANTOPRAZOLE SODIUM 40 MILLIGRAM(S): 20 TABLET, DELAYED RELEASE ORAL at 05:31

## 2020-07-23 RX ADMIN — Medication 650 MILLIGRAM(S): at 14:20

## 2020-07-23 RX ADMIN — SODIUM CHLORIDE 100 MILLILITER(S): 9 INJECTION, SOLUTION INTRAVENOUS at 05:31

## 2020-07-23 RX ADMIN — Medication 650 MILLIGRAM(S): at 13:25

## 2020-07-23 RX ADMIN — Medication 100 MILLIGRAM(S): at 22:04

## 2020-07-23 RX ADMIN — CEFEPIME 100 MILLIGRAM(S): 1 INJECTION, POWDER, FOR SOLUTION INTRAMUSCULAR; INTRAVENOUS at 22:04

## 2020-07-23 RX ADMIN — Medication 100 MILLIGRAM(S): at 13:28

## 2020-07-23 RX ADMIN — HEPARIN SODIUM 5000 UNIT(S): 5000 INJECTION INTRAVENOUS; SUBCUTANEOUS at 22:05

## 2020-07-23 RX ADMIN — Medication 112 MICROGRAM(S): at 05:30

## 2020-07-23 NOTE — CONSULT NOTE ADULT - ASSESSMENT
59 F with perforated diverticulitis and fluid collection s/p drainage 7/6. Follow up CT 7/20 demonstrates a small residual gas and fluid-containing collection and partially dislodged drainage catheter. Catheter does not flush or aspirate and is therefore completely withdrawn from the collection. Patient is clinically nontoxic, hemodynamically stable, asymptomatic, and WBC continues to be normal.    IMPRESSION;   Acute diverticulitis with multiple abscesses  CT Abdomen 7/20 : Decrease in the size of the abscess in the region of the cul-de-sac (3/86) compared with the previous study. A second phlegmon with gas bubbles above the level of the sigmoid colon, has decreased in size from 3.7 cm to 2.6 cm.  IVR 7/23 : no access to collections. Prior drain removed  6/6 abscess cultures NG  WBC 8.5    RECOMMENDATIONS;  Midline   Cefepime 2 gm iv q8h ( PCN allergy is a faint rash as a child )  flagyl 500 mg iv q8h  Home on iv Ertapenem 1 gm iv q24h for 2-3 weeks

## 2020-07-23 NOTE — CONSULT NOTE ADULT - SUBJECTIVE AND OBJECTIVE BOX
INTERVENTIONAL RADIOLOGY CONSULT:     Procedure Requested: Drain adjustment/replacement    HPI:  Patient is a 59 year old female with a PMH of acute perforated diverticulitis with abscess s/p IR drainage and PSH of thyroidectomy  and radiation for thyroid neoplasm. She is presenting the ED after being sent in by her surgeon because she had a follow up CT that showed the abscess was small but the drain was not in the best position anymore to finish draining the rest of the collection.    Carolyn endorses general malaise but denies any other symptoms. No fever, chills, abdominopelvic pain, nausea, vomiting, or dysuria. She states the drain had small straw-colored clear output for a while after it was placed but no output recently.      PAST MEDICAL & SURGICAL HISTORY:  Hyperlipidemia  Perforated diverticulum  Acquired hypothyroidism  H/O:   H/O thyroidectomy      MEDICATIONS  (STANDING):  atorvastatin 20 milliGRAM(s) Oral at bedtime  ciprofloxacin   IVPB 400 milliGRAM(s) IV Intermittent every 12 hours  heparin   Injectable 5000 Unit(s) SubCutaneous every 8 hours  lactated ringers. 1000 milliLiter(s) (100 mL/Hr) IV Continuous <Continuous>  levothyroxine 112 MICROGram(s) Oral daily  metroNIDAZOLE  IVPB 500 milliGRAM(s) IV Intermittent every 8 hours  pantoprazole    Tablet 40 milliGRAM(s) Oral before breakfast    MEDICATIONS  (PRN):      Allergies    penicillin (Other)    Intolerances    FAMILY HISTORY:      Physical Exam:   Vital Signs Last 24 Hrs  T(C): 36.9 (2020 04:49), Max: 37.2 (2020 23:35)  T(F): 98.4 (2020 04:49), Max: 98.9 (2020 23:35)  HR: 92 (2020 04:49) (92 - 105)  BP: 168/75 (2020 04:49) (122/70 - 168/75)  BP(mean): --  RR: 18 (2020 04:49) (18 - 18)  SpO2: 96% (2020 23:35) (95% - 96%)    GENERAL: Resting comfortably in bed. NAD.  NEURO: Alert and oriented x3.  CARDIAC: Normal heart rate.  RESPIRATORY: Breathing comfortably on room air.  ABDOMEN: Soft, nontender. Transgluteal drain to bulb suction with no output.  EXTREMITIES: No peripheral edema.      Labs:                         11.2   8.50  )-----------( 411      ( 2020 15:05 )             34.8     07-22    140  |  101  |  11  ----------------------------<  119<H>  3.6   |  27  |  0.6<L>    Ca    8.8      2020 15:05    TPro  6.7  /  Alb  3.7  /  TBili  0.4  /  DBili  x   /  AST  9   /  ALT  8   /  AlkPhos  105  07-22    PT/INR - ( 2020 15:05 )   PT: 13.10 sec;   INR: 1.14 ratio         PTT - ( 2020 15:05 )  PTT:26.0 sec    Pertinent labs:                      11.2   8.50  )-----------( 411      ( 2020 15:05 )             34.8       07-22    140  |  101  |  11  ----------------------------<  119<H>  3.6   |  27  |  0.6<L>    Ca    8.8      2020 15:05    TPro  6.7  /  Alb  3.7  /  TBili  0.4  /  DBili  x   /  AST  9   /  ALT  8   /  AlkPhos  105  07-22      PT/INR - ( 2020 15:05 )   PT: 13.10 sec;   INR: 1.14 ratio         PTT - ( 2020 15:05 )  PTT:26.0 sec    Radiology & Additional Studies:     Radiology imaging reviewed.       ASSESSMENT AND PLAN:  59F with perforated diverticulitis and fluid collection s/p drainage . Follow up CT  demonstrates a small residual gas and fluid-containing collection and partially dislodged drainage catheter. Catheter does not flush or aspirate and is therefore completely withdrawn from the collection. Patient is clinically nontoxic, hemodynamically stable, asymptomatic, and WBC continues to be normal.   - No intervention is indicated at this time. The benefit of de bret drainage does not outweigh the risks in this asymptomatic, stable patient.   - Will remove dislodged drainage catheter.   - Continued medical +/- surgical management as indicated.    Thank you for the courtesy of this consult, please call [phone] with any further questions.

## 2020-07-23 NOTE — PATIENT PROFILE ADULT - NSPROMUTINFOINDIVIDFT_GEN_A_NUR
patient had a LEELA placed on 7/6/20 to left buttock, present on this admission because drain is not working as per MD Aguilar

## 2020-07-23 NOTE — CONSULT NOTE ADULT - SUBJECTIVE AND OBJECTIVE BOX
DIONNE PETERSEN  59y, Female  Allergy: penicillin (Other)      All historical available data reviewed.    HPI:  Patient is a 59 year old female with a PMH of acute perforated diverticulitis with abscess s/p IR drainage and PSH of thyroidectomy  and radiation for thyroid neoplasm. She is presenting the ED after being sent in by her surgeon jocelin she had a follow up CT that showed the abscess was small but the drain was not in the best position anymore to finish draining the rest of the collection. She has mild LLQ pain but is otherwise doing well. (2020 14:46)  ID called for ABx    FAMILY HISTORY:    PAST MEDICAL & SURGICAL HISTORY:  Hyperlipidemia  Perforated diverticulum  Acquired hypothyroidism  H/O:   H/O thyroidectomy        VITALS:  T(F): 98.4, Max: 98.9 (20 @ 23:35)  HR: 88  BP: 144/72  RR: 20Vital Signs Last 24 Hrs  T(C): 36.9 (2020 12:45), Max: 37.2 (2020 23:35)  T(F): 98.4 (2020 12:45), Max: 98.9 (2020 23:35)  HR: 88 (2020 12:45) (88 - 105)  BP: 144/72 (2020 12:45) (122/70 - 168/75)  BP(mean): --  RR: 20 (2020 12:45) (18 - 20)  SpO2: 96% (2020 23:35) (95% - 96%)    TESTS & MEASUREMENTS:                        10.1   7.61  )-----------( 384      ( 2020 08:38 )             31.4     07-23    140  |  103  |  7<L>  ----------------------------<  101<H>  3.6   |  26  |  <0.5<L>    Ca    8.3<L>      2020 08:38  Phos  3.3     07-23  Mg     2.2     07    TPro  6.7  /  Alb  3.7  /  TBili  0.4  /  DBili  x   /  AST  9   /  ALT  8   /  AlkPhos  105  0722    LIVER FUNCTIONS - ( 2020 15:05 )  Alb: 3.7 g/dL / Pro: 6.7 g/dL / ALK PHOS: 105 U/L / ALT: 8 U/L / AST: 9 U/L / GGT: x                   RADIOLOGY & ADDITIONAL TESTS:  Personal review of radiological diagnostics performed  Echo and EKG results noted when applicable.     MEDICATIONS:  acetaminophen   Tablet .. 650 milliGRAM(s) Oral every 6 hours PRN  atorvastatin 20 milliGRAM(s) Oral at bedtime  ciprofloxacin   IVPB 400 milliGRAM(s) IV Intermittent every 12 hours  dextrose 5% + sodium chloride 0.45%. 1000 milliLiter(s) IV Continuous <Continuous>  heparin   Injectable 5000 Unit(s) SubCutaneous every 8 hours  levothyroxine 112 MICROGram(s) Oral daily  metroNIDAZOLE  IVPB 500 milliGRAM(s) IV Intermittent every 8 hours  pantoprazole    Tablet 40 milliGRAM(s) Oral before breakfast      ANTIBIOTICS:  ciprofloxacin   IVPB 400 milliGRAM(s) IV Intermittent every 12 hours  metroNIDAZOLE  IVPB 500 milliGRAM(s) IV Intermittent every 8 hours

## 2020-07-23 NOTE — PROGRESS NOTE ADULT - SUBJECTIVE AND OBJECTIVE BOX
DIONNE PETERSEN  59y Female   [id number]    Hospital Day:   Post Operative Day:  Procedure:  Patient is a 59y old  Female who presents with a chief complaint of   PAST MEDICAL & SURGICAL HISTORY:  Hyperlipidemia  Perforated diverticulum  Acquired hypothyroidism  H/O:   H/O thyroidectomy      Events of the Last 24h:  Vital Signs Last 24 Hrs  T(C): 37.2 (2020 23:35), Max: 37.2 (2020 23:35)  T(F): 98.9 (2020 23:35), Max: 98.9 (2020 23:35)  HR: 97 (2020 23:35) (92 - 105)  BP: 145/73 (2020 23:35) (122/70 - 145/73)  BP(mean): --  RR: 18 (2020 23:35) (18 - 18)  SpO2: 96% (2020 23:35) (95% - 96%)        Diet, NPO:   Except Medications (20 @ 17:53)      I&O's Summary   I&O's Detail      MEDICATIONS  (STANDING):  atorvastatin 20 milliGRAM(s) Oral at bedtime  ciprofloxacin   IVPB 400 milliGRAM(s) IV Intermittent every 12 hours  heparin   Injectable 5000 Unit(s) SubCutaneous every 8 hours  lactated ringers. 1000 milliLiter(s) (100 mL/Hr) IV Continuous <Continuous>  levothyroxine 112 MICROGram(s) Oral daily  metroNIDAZOLE  IVPB 500 milliGRAM(s) IV Intermittent every 8 hours  pantoprazole    Tablet 40 milliGRAM(s) Oral before breakfast    MEDICATIONS  (PRN):      PHYSICAL EXAM:    GENERAL: NAD    HEENT: NCAT    CHEST/LUNGS: CTAB    HEART: RRR,  No murmurs, rubs, or gallops    ABDOMEN: SNTND +BS    EXTREMITIES:  FROM, No clubbing, cyanosis, or edema, palpable pulse    NEURO: No focal neurological deficits    SKIN: No rashes or lesions    INCISION/WOUNDS:                          11.2   8.50  )-----------( 411      ( 2020 15:05 )             34.8        CBC Full  -  ( 2020 15:05 )  WBC Count : 8.50 K/uL  RBC Count : 4.05 M/uL  Hemoglobin : 11.2 g/dL  Hematocrit : 34.8 %  Platelet Count - Automated : 411 K/uL  Mean Cell Volume : 85.9 fL  Mean Cell Hemoglobin : 27.7 pg  Mean Cell Hemoglobin Concentration : 32.2 g/dL  Auto Neutrophil # : 5.73 K/uL  Auto Lymphocyte # : 1.60 K/uL  Auto Monocyte # : 0.97 K/uL  Auto Eosinophil # : 0.11 K/uL  Auto Basophil # : 0.04 K/uL  Auto Neutrophil % : 67.4 %  Auto Lymphocyte % : 18.8 %  Auto Monocyte % : 11.4 %  Auto Eosinophil % : 1.3 %  Auto Basophil % : 0.5 %               140   |  101   |  11                 Ca: 8.8    BMP:   ----------------------------< 119    Mg: x     (20 @ 15:05)             3.6    |  27    | 0.6                Ph: x        LFT:     TPro: 6.7 / Alb: 3.7 / TBili: 0.4 / DBili: x / AST: 9 / ALT: 8 / AlkPhos: 105   (20 @ 15:05)    LIVER FUNCTIONS - ( 2020 15:05 )  Alb: 3.7 g/dL / Pro: 6.7 g/dL / ALK PHOS: 105 U/L / ALT: 8 U/L / AST: 9 U/L / GGT: x           PT/INR - ( 2020 15:05 )   PT: 13.10 sec;   INR: 1.14 ratio         PTT - ( 2020 15:05 )  PTT:26.0 sec        < from: CT Abdomen and Pelvis w/ Oral Cont and w/ IV Cont (20 @ 15:00) >      IMPRESSION:     1. There has been a decrease in the size of the abscess in the region of the cul-de-sac (3/86) compared with the previous study. The residual cavity which contains fluid and gas measures 4.3 cm in its widest diameter at this time. If flushing of the catheter does not eliminate this portion of the collection, the possibility of a separate loculation should be considered.     2. A second phlegmon with gas bubbles indicating a second area of infection within the mesentery (3/72), above the level of the sigmoid colon, has decreased in size from 3.7 cm to 2.6 cm.    < end of copied text >

## 2020-07-24 RX ORDER — ERTAPENEM SODIUM 1 G/1
1000 INJECTION, POWDER, LYOPHILIZED, FOR SOLUTION INTRAMUSCULAR; INTRAVENOUS EVERY 24 HOURS
Refills: 0 | Status: DISCONTINUED | OUTPATIENT
Start: 2020-07-24 | End: 2020-07-25

## 2020-07-24 RX ORDER — POTASSIUM CHLORIDE 20 MEQ
20 PACKET (EA) ORAL
Refills: 0 | Status: COMPLETED | OUTPATIENT
Start: 2020-07-24 | End: 2020-07-24

## 2020-07-24 RX ADMIN — HEPARIN SODIUM 5000 UNIT(S): 5000 INJECTION INTRAVENOUS; SUBCUTANEOUS at 21:10

## 2020-07-24 RX ADMIN — ERTAPENEM SODIUM 120 MILLIGRAM(S): 1 INJECTION, POWDER, LYOPHILIZED, FOR SOLUTION INTRAMUSCULAR; INTRAVENOUS at 17:08

## 2020-07-24 RX ADMIN — PANTOPRAZOLE SODIUM 40 MILLIGRAM(S): 20 TABLET, DELAYED RELEASE ORAL at 06:07

## 2020-07-24 RX ADMIN — HEPARIN SODIUM 5000 UNIT(S): 5000 INJECTION INTRAVENOUS; SUBCUTANEOUS at 13:16

## 2020-07-24 RX ADMIN — HEPARIN SODIUM 5000 UNIT(S): 5000 INJECTION INTRAVENOUS; SUBCUTANEOUS at 05:32

## 2020-07-24 RX ADMIN — Medication 20 MILLIEQUIVALENT(S): at 07:49

## 2020-07-24 RX ADMIN — CEFEPIME 100 MILLIGRAM(S): 1 INJECTION, POWDER, FOR SOLUTION INTRAMUSCULAR; INTRAVENOUS at 05:31

## 2020-07-24 RX ADMIN — Medication 100 MILLIGRAM(S): at 13:17

## 2020-07-24 RX ADMIN — Medication 100 MILLIGRAM(S): at 06:07

## 2020-07-24 RX ADMIN — Medication 100 MILLIGRAM(S): at 21:10

## 2020-07-24 RX ADMIN — Medication 20 MILLIEQUIVALENT(S): at 06:07

## 2020-07-24 RX ADMIN — Medication 650 MILLIGRAM(S): at 22:54

## 2020-07-24 RX ADMIN — Medication 112 MICROGRAM(S): at 05:32

## 2020-07-24 RX ADMIN — Medication 650 MILLIGRAM(S): at 22:24

## 2020-07-24 RX ADMIN — ATORVASTATIN CALCIUM 20 MILLIGRAM(S): 80 TABLET, FILM COATED ORAL at 21:10

## 2020-07-24 NOTE — PROCEDURE NOTE - NSPROCDETAILS_GEN_ALL_CORE
ultrasound guidance/sterile dressing applied location identified, draped/prepped, sterile technique used/supine position/ultrasound guidance/sterile dressing applied/sterile technique, catheter placed

## 2020-07-24 NOTE — PROGRESS NOTE ADULT - SUBJECTIVE AND OBJECTIVE BOX
DIAGNOSIS:   HOSPITAL DAY #:    STATUS POST:    POST OPERATIVE DAY #:     Vital Signs Last 24 Hrs  T(C): 37.6 (24 Jul 2020 20:50), Max: 37.6 (24 Jul 2020 20:50)  T(F): 99.7 (24 Jul 2020 20:50), Max: 99.7 (24 Jul 2020 20:50)  HR: 85 (24 Jul 2020 20:50) (85 - 93)  BP: 162/86 (24 Jul 2020 20:50) (122/66 - 162/86)  BP(mean): --  RR: 18 (24 Jul 2020 20:50) (18 - 18)  SpO2: --    SUBJECTIVE: Pt seen    Pain: YES  [ ]   NO [ ]   Nausea: [ ] YES [ ] NO           Vomiting: [ ] YES [ ] NO  Flatus: [ ] YES [ ] NO             Bowel Movement: [ ] YES [ ] NO     Void: [ ]YES [ ]No      LEELA DRAINAGE: SIGNIFICANT [ ]   NOT SIGNIFICANT [ ]   NOT APPLICABLE [ ]  YES [ ] NO    General Appearance: Appears well, NAD  Neck: Supple  Chest: Equal expansion bilaterally, equal breath sounds  CV: Pulse regular presently  Abdomen: Soft [x ] YES [ ]NO  DISTENDED [ ] YES [x ] NO TENDERNESS [ ]YES [x ]NO  INCISIONS: HEALING WELL [ ] YES  [ ] NO ERYTHEMA [ ] YES [ ] NO DRAINAGE [ ] YES  [ ] NO  Extremities: Grossly symmetric, CALF TENDERNESS [ ] YES  [ ] NO      LABS:                        10.0   8.15  )-----------( 356      ( 23 Jul 2020 21:44 )             32.1     07-23    138  |  101  |  7<L>  ----------------------------<  107<H>  3.5   |  26  |  0.6<L>    Ca    8.3<L>      23 Jul 2020 21:44  Phos  3.3     07-23  Mg     2.2     07-23      PT/INR - ( 23 Jul 2020 08:38 )   PT: 13.20 sec;   INR: 1.15 ratio         PTT - ( 23 Jul 2020 08:38 )  PTT:27.9 sec        ASSESSMENT:     GOOD POST OP COURSE [ ]  YES  [ ] NO  CONDITION IMPROVING  []  YES  [ ]  NO          PLAN: for IV AB as out pt    CONTINUE PRESENT MANAGEMENT  [ ] YES  [ ] NO

## 2020-07-24 NOTE — PROGRESS NOTE ADULT - SUBJECTIVE AND OBJECTIVE BOX
DIONNE PETERSEN  59y Female   [id number]    Hospital Day: 2  Post Operative Day:  Procedure:  Patient is a 59y old  Female who presents with a chief complaint of Pelvic collection, drain adjustment (2020 08:35)    PAST MEDICAL & SURGICAL HISTORY:  Hyperlipidemia  Perforated diverticulum  Acquired hypothyroidism  H/O:   H/O thyroidectomy      Events of the Last 24h:  Vital Signs Last 24 Hrs  T(C): 37.4 (2020 20:53), Max: 37.4 (2020 20:53)  T(F): 99.3 (2020 20:53), Max: 99.3 (2020 20:53)  HR: 87 (2020 20:53) (87 - 92)  BP: 119/61 (2020 20:53) (119/61 - 168/75)  BP(mean): --  RR: 18 (2020 20:53) (18 - 20)  SpO2: --        Diet, Low Fiber (20 @ 16:47)      I&O's Summary    2020 07:  -  2020 07:00  --------------------------------------------------------  IN: 700 mL / OUT: 0 mL / NET: 700 mL     I&O's Detail    2020 07:  -  2020 07:00  --------------------------------------------------------  IN:    IV PiggyBack: 300 mL    lactated ringers.: 400 mL  Total IN: 700 mL    OUT:  Total OUT: 0 mL    Total NET: 700 mL          MEDICATIONS  (STANDING):  atorvastatin 20 milliGRAM(s) Oral at bedtime  cefepime   IVPB 2000 milliGRAM(s) IV Intermittent every 8 hours  heparin   Injectable 5000 Unit(s) SubCutaneous every 8 hours  levothyroxine 112 MICROGram(s) Oral daily  metroNIDAZOLE  IVPB 500 milliGRAM(s) IV Intermittent every 8 hours  pantoprazole    Tablet 40 milliGRAM(s) Oral before breakfast    MEDICATIONS  (PRN):  acetaminophen   Tablet .. 650 milliGRAM(s) Oral every 6 hours PRN Mild Pain (1 - 3)      PHYSICAL EXAM:    GENERAL: NAD    HEENT: NCAT    CHEST/LUNGS: CTAB    HEART: RRR,  No murmurs, rubs, or gallops    ABDOMEN: SNTND +BS    EXTREMITIES:  FROM, No clubbing, cyanosis, or edema, palpable pulse    NEURO: No focal neurological deficits    SKIN: No rashes or lesions    INCISION/WOUNDS:                          10.0   8.15  )-----------( 356      ( 2020 21:44 )             32.1        CBC Full  -  ( 2020 21:44 )  WBC Count : 8.15 K/uL  RBC Count : 3.76 M/uL  Hemoglobin : 10.0 g/dL  Hematocrit : 32.1 %  Platelet Count - Automated : 356 K/uL  Mean Cell Volume : 85.4 fL  Mean Cell Hemoglobin : 26.6 pg  Mean Cell Hemoglobin Concentration : 31.2 g/dL  Auto Neutrophil # : x  Auto Lymphocyte # : x  Auto Monocyte # : x  Auto Eosinophil # : x  Auto Basophil # : x  Auto Neutrophil % : x  Auto Lymphocyte % : x  Auto Monocyte % : x  Auto Eosinophil % : x  Auto Basophil % : x               138   |  101   |  7                  Ca: 8.3    BMP:   ----------------------------< 107    M.2   (20 @ 21:44)             3.5    |  26    | 0.6                Ph: x        LFT:     TPro: 6.7 / Alb: 3.7 / TBili: 0.4 / DBili: x / AST: 9 / ALT: 8 / AlkPhos: 105   (20 @ 15:05)    LIVER FUNCTIONS - ( 2020 15:05 )  Alb: 3.7 g/dL / Pro: 6.7 g/dL / ALK PHOS: 105 U/L / ALT: 8 U/L / AST: 9 U/L / GGT: x           PT/INR - ( 2020 08:38 )   PT: 13.20 sec;   INR: 1.15 ratio         PTT - ( 2020 08:38 )  PTT:27.9 sec          Culture - Blood (collected 2020 15:00)  Source: .Blood Blood  Preliminary Report (2020 01:02):    No growth to date.    Culture - Blood (collected 2020 15:00)  Source: .Blood Blood  Preliminary Report (2020 01:02):    No growth to date.    IR No intervention is indicated at this time. The benefit of de bret drainage does not outweigh the risks in this asymptomatic, stable patient.   - Will remove dislodged drainage catheter.   - Continued medical +/- surgical management as indicated.

## 2020-07-24 NOTE — PROGRESS NOTE ADULT - SUBJECTIVE AND OBJECTIVE BOX
NICOLA, DIONNE  59y, Female    All available historical data reviewed    OVERNIGHT EVENTS:  none    ROS:  General: Denies rigors, nightsweats  HEENT: Denies headache, rhinorrhea, sore throat, eye pain  CV: Denies CP, palpitations  PULM: Denies wheezing, hemoptysis  GI: Denies hematemesis, hematochezia, melena  : Denies discharge, hematuria  MSK: Denies arthralgias, myalgias  SKIN: Denies rash, lesions  NEURO: Denies paresthesias, weakness  PSYCH: Denies depression, anxiety    VITALS:  T(F): 97.8, Max: 99.3 (07-23-20 @ 20:53)  HR: 93  BP: 122/66  RR: 18Vital Signs Last 24 Hrs  T(C): 36.6 (24 Jul 2020 12:00), Max: 37.4 (23 Jul 2020 20:53)  T(F): 97.8 (24 Jul 2020 12:00), Max: 99.3 (23 Jul 2020 20:53)  HR: 93 (24 Jul 2020 12:00) (87 - 93)  BP: 122/66 (24 Jul 2020 12:00) (119/61 - 146/80)  BP(mean): --  RR: 18 (24 Jul 2020 12:00) (18 - 20)  SpO2: --    TESTS & MEASUREMENTS:                        10.0   8.15  )-----------( 356      ( 23 Jul 2020 21:44 )             32.1     07-23    138  |  101  |  7<L>  ----------------------------<  107<H>  3.5   |  26  |  0.6<L>    Ca    8.3<L>      23 Jul 2020 21:44  Phos  3.3     07-23  Mg     2.2     07-23    TPro  6.7  /  Alb  3.7  /  TBili  0.4  /  DBili  x   /  AST  9   /  ALT  8   /  AlkPhos  105  07-22    LIVER FUNCTIONS - ( 22 Jul 2020 15:05 )  Alb: 3.7 g/dL / Pro: 6.7 g/dL / ALK PHOS: 105 U/L / ALT: 8 U/L / AST: 9 U/L / GGT: x             Culture - Blood (collected 07-22-20 @ 15:00)  Source: .Blood Blood  Preliminary Report (07-24-20 @ 01:02):    No growth to date.    Culture - Blood (collected 07-22-20 @ 15:00)  Source: .Blood Blood  Preliminary Report (07-24-20 @ 01:02):    No growth to date.            RADIOLOGY & ADDITIONAL TESTS:  Personal review of radiological diagnostics performed  Echo and EKG results noted when applicable.     MEDICATIONS:  acetaminophen   Tablet .. 650 milliGRAM(s) Oral every 6 hours PRN  atorvastatin 20 milliGRAM(s) Oral at bedtime  cefepime   IVPB 2000 milliGRAM(s) IV Intermittent every 8 hours  ertapenem  IVPB 1000 milliGRAM(s) IV Intermittent every 24 hours  heparin   Injectable 5000 Unit(s) SubCutaneous every 8 hours  levothyroxine 112 MICROGram(s) Oral daily  metroNIDAZOLE  IVPB 500 milliGRAM(s) IV Intermittent every 8 hours  pantoprazole    Tablet 40 milliGRAM(s) Oral before breakfast      ANTIBIOTICS:  cefepime   IVPB 2000 milliGRAM(s) IV Intermittent every 8 hours  ertapenem  IVPB 1000 milliGRAM(s) IV Intermittent every 24 hours  metroNIDAZOLE  IVPB 500 milliGRAM(s) IV Intermittent every 8 hours

## 2020-07-24 NOTE — PHARMACOTHERAPY INTERVENTION NOTE - COMMENTS
Spoke with MD to inform of duplication of therapy with active cefepime order and new ertapenem order. MD said will d/c cefepime

## 2020-07-24 NOTE — CHART NOTE - NSCHARTNOTEFT_GEN_A_CORE
I spoke to DR Charles about the patient as he prescribed Ertapenem for 2-3 weeks for the patient   the patient has allergy to penicillin   Dr Charles confirmed to me that giving the patient Ertapenem is safe for the patient condition

## 2020-07-25 VITALS
RESPIRATION RATE: 18 BRPM | HEART RATE: 59 BPM | TEMPERATURE: 97 F | WEIGHT: 105.38 LBS | SYSTOLIC BLOOD PRESSURE: 141 MMHG | DIASTOLIC BLOOD PRESSURE: 67 MMHG

## 2020-07-25 RX ORDER — ERTAPENEM SODIUM 1 G/1
1 INJECTION, POWDER, LYOPHILIZED, FOR SOLUTION INTRAMUSCULAR; INTRAVENOUS
Qty: 0 | Refills: 0 | DISCHARGE
Start: 2020-07-25 | End: 2020-08-15

## 2020-07-25 RX ADMIN — HEPARIN SODIUM 5000 UNIT(S): 5000 INJECTION INTRAVENOUS; SUBCUTANEOUS at 05:09

## 2020-07-25 RX ADMIN — HEPARIN SODIUM 5000 UNIT(S): 5000 INJECTION INTRAVENOUS; SUBCUTANEOUS at 13:47

## 2020-07-25 RX ADMIN — ERTAPENEM SODIUM 120 MILLIGRAM(S): 1 INJECTION, POWDER, LYOPHILIZED, FOR SOLUTION INTRAMUSCULAR; INTRAVENOUS at 15:44

## 2020-07-25 RX ADMIN — PANTOPRAZOLE SODIUM 40 MILLIGRAM(S): 20 TABLET, DELAYED RELEASE ORAL at 06:02

## 2020-07-25 RX ADMIN — Medication 112 MICROGRAM(S): at 05:09

## 2020-07-25 NOTE — DISCHARGE NOTE PROVIDER - NSDCACTIVITY_GEN_ALL_CORE
No heavy lifting/straining/No restrictions/Return to Work/School allowed/Driving allowed/Walking - Outdoors allowed/Stairs allowed/Showering allowed

## 2020-07-25 NOTE — DISCHARGE NOTE NURSING/CASE MANAGEMENT/SOCIAL WORK - PATIENT PORTAL LINK FT
You can access the FollowMyHealth Patient Portal offered by [facility] by registering at the following website: [email or web address]. By joining [employer]’s FollowMyHealth portal, you will also be able to view your health information using other applications (apps) compatible with our system.

## 2020-07-25 NOTE — DISCHARGE NOTE PROVIDER - NSDCCPCAREPLAN_GEN_ALL_CORE_FT
PRINCIPAL DISCHARGE DIAGNOSIS  Diagnosis: Perforated diverticulum  Assessment and Plan of Treatment:       SECONDARY DISCHARGE DIAGNOSES  Diagnosis: Abscess  Assessment and Plan of Treatment:

## 2020-07-25 NOTE — DISCHARGE NOTE PROVIDER - NSDCFUADDINST_GEN_ALL_CORE_FT
You are being discharged from [facility]. Please follow up with Dr. Aguilar as previously discussed/in 1-2 weeks.

## 2020-07-25 NOTE — PROGRESS NOTE ADULT - ASSESSMENT
59F with perforated diverticulitis with abscess s/p IR drainage now requiring drain replacement to finish draining collection       NPO  IVF  IV cipro flagyl   ID consult   IR consult
Diverticulitis with collection Ertapenem IV for 3 weeks via midline   anticipate discharge today
IR drain removed  IV abx  Needs  midline  dispo planning   home with iv abx
· Assessment		  59 F with perforated diverticulitis and fluid collection s/p drainage 7/6. Follow up CT 7/20 demonstrates a small residual gas and fluid-containing collection and partially dislodged drainage catheter. Catheter does not flush or aspirate and is therefore completely withdrawn from the collection. Patient is clinically nontoxic, hemodynamically stable, asymptomatic, and WBC continues to be normal.    IMPRESSION;   Acute diverticulitis with multiple abscesses  CT Abdomen 7/20 : Decrease in the size of the abscess in the region of the cul-de-sac (3/86) compared with the previous study. A second phlegmon with gas bubbles above the level of the sigmoid colon, has decreased in size from 3.7 cm to 2.6 cm.  IVR 7/23 : no access to collections. Prior drain removed  CT Abdomen 7/23:   Slight decrease in size of abscess in the pelvis anterior to the rectum, measures approximately 4.0 x 3.0 x 2.0 cm, previously 4.2 x 3.3 x 2.8 cm. No contrast extravasation. No evidence of bowel obstruction.  6/6 abscess cultures NG  WBC 8.5  BCX 7/22 NGTD  COVID-19 NG    RECOMMENDATIONS;  Home on iv Ertapenem 1 gm iv q24h for 3 weeks   CT prior to stopping ABx  f/u with Dr Houser [phone] at [address] [facility] on tuesday 8/4 via telehealth  recall prn please

## 2020-07-25 NOTE — PROGRESS NOTE ADULT - ATTENDING COMMENTS
above noted abdomen soft no distension/tenderness drain removed  repeat ct scan as per IR
above noted discussed case with surgical resident on IV AB will follow pt as out pt and repeat ct scan

## 2020-07-25 NOTE — DISCHARGE NOTE PROVIDER - HOSPITAL COURSE
FROM ADMISSION H+P:     HPI:    Patient is a 59 year old female with a PMH of acute perforated diverticulitis with abscess s/p IR drainage and PSH of thyroidectomy 2005 and radiation for thyroid neoplasm. She is presenting the ED after being sent in by her surgeon because she had a follow up CT that showed the abscess was small but the drain was not in the best position anymore to finish draining the rest of the collection. She has mild LLQ pain but is otherwise doing well. (22 Jul 2020 14:46)    ---    HOSPITAL COURSE:     The IR drain was removed by IR as her abscess was not adequately being drained. Follow up CTAP with IV and oral contrast showed a reduction in size of the abscess. The decision was made to discharge the patient with an extended course of Ertapenem and follow up outpatient for resolution of abscess.        Patient was medically optimized and improved clinically throughout hospital course. Patient seen and examined on day of discharge.        Vital Signs Last 24 Hrs    T(C): 36.9 (25 Jul 2020 14:04), Max: 37.6 (24 Jul 2020 20:50)    T(F): 98.5 (25 Jul 2020 14:04), Max: 99.7 (24 Jul 2020 20:50)    HR: 91 (25 Jul 2020 14:04) (85 - 91)    BP: 136/86 (25 Jul 2020 14:04) (136/86 - 162/86)    BP(mean): --    RR: 20 (25 Jul 2020 14:04) (18 - 20)    SpO2: --        Physical Exam:    General: Well developed, well nourished, in no acute distress    Respiratory: CTA B/L, No wheezing, rales, or rhonchi    CV: RRR, S1/S2 present, no murmurs, rubs, or gallops    Abdominal: Soft, nontender, non-distended, normoactive bowel sounds        Patient is medically stable for discharge to home with outpatient follow up.    ---    CONSULTANTS:     Infectious Disease: recommended discharge home on IV ertapenem 1gm q24 for 3 weeks    IR: for drain placement, they removed the drain as it was not in adequate position    ---    TIME SPENT:    I, the attending physician, was physically present for the key portions of the evaluation and management (E/M) service provided. The total amount of time spent reviewing the hospital notes, laboratory values, imaging findings, assessing/counseling the patient, discussing with consultant physicians, social work, nursing staff was -- minutes        ---    Primary care provider was made aware of plan for discharge:      [  ] NO     [  x] YES

## 2020-07-25 NOTE — DISCHARGE NOTE PROVIDER - CARE PROVIDER_API CALL
Abhay Aguilar  SURGERY  [address] [place] [address]  Phone: [phone]  Fax: [phone]  Follow Up Time: 2 weeks

## 2020-07-25 NOTE — PROGRESS NOTE ADULT - SUBJECTIVE AND OBJECTIVE BOX
GENERAL SURGERY PROGRESS NOTE     DIONNE PETERSEN  59y  Female  Hospital day :3d  POD:  Procedure: Midline catheter insertion    OVERNIGHT EVENTS:    T(F): 98.5 (07-25-20 @ 00:54), Max: 99.7 (07-24-20 @ 20:50)  HR: 85 (07-25-20 @ 00:54) (85 - 93)  BP: 144/76 (07-25-20 @ 00:54) (122/66 - 162/86)  ABP: --  ABP(mean): --  RR: 18 (07-25-20 @ 00:54) (18 - 18)  SpO2: --    DIET/FLUIDS:   NG:                                                                              DRAINS:   BM:   EMESIS:   URINE:    GI proph:  pantoprazole    Tablet 40 milliGRAM(s) Oral before breakfast    AC/ proph: heparin   Injectable 5000 Unit(s) SubCutaneous every 8 hours    ABx: ertapenem  IVPB 1000 milliGRAM(s) IV Intermittent every 24 hours      PHYSICAL EXAM:  GENERAL: NAD, well-appearing  CHEST/LUNG: Clear to auscultation bilaterally  HEART: Regular rate and rhythm  ABDOMEN: Soft, Nontender, Nondistended;   EXTREMITIES:  No clubbing, cyanosis, or edema      LABS  Labs:  CAPILLARY BLOOD GLUCOSE                              10.0   8.15  )-----------( 356      ( 23 Jul 2020 21:44 )             32.1         07-23    138  |  101  |  7<L>  ----------------------------<  107<H>  3.5   |  26  |  0.6<L>          LFTs:     Lactate, Blood: 1.0 mmol/L (07-22-20 @ 15:05)      Coags:     13.20  ----< 1.15    ( 23 Jul 2020 08:38 )     27.9                    Culture - Blood (collected 22 Jul 2020 15:00)  Source: .Blood Blood  Preliminary Report (24 Jul 2020 01:02):    No growth to date.    Culture - Blood (collected 22 Jul 2020 15:00)  Source: .Blood Blood  Preliminary Report (24 Jul 2020 01:02):    No growth to date.          RADIOLOGY & ADDITIONAL TESTS:

## 2020-07-25 NOTE — DISCHARGE NOTE PROVIDER - NSDCMRMEDTOKEN_GEN_ALL_CORE_FT
acetaminophen 325 mg oral tablet: 2 tab(s) orally every 6 hours, As needed, Temp greater or equal to 38C (100.4F), Mild Pain (1 - 3)  ertapenem 1 g injection: 1 gram(s) injectable once a day  levothyroxine 112 mcg (0.112 mg) oral tablet: 2 tab(s) orally once a day  Lipitor 20 mg oral tablet: 1 tab(s) orally once a day

## 2020-07-28 LAB
CULTURE RESULTS: SIGNIFICANT CHANGE UP
CULTURE RESULTS: SIGNIFICANT CHANGE UP
SPECIMEN SOURCE: SIGNIFICANT CHANGE UP
SPECIMEN SOURCE: SIGNIFICANT CHANGE UP

## 2020-08-05 DIAGNOSIS — E66.9 OBESITY, UNSPECIFIED: ICD-10-CM
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
## 2020-08-05 DIAGNOSIS — Z85.850 PERSONAL HISTORY OF MALIGNANT NEOPLASM OF THYROID: ICD-10-CM

## 2020-08-05 DIAGNOSIS — E89.0 POSTPROCEDURAL HYPOTHYROIDISM: ICD-10-CM

## 2020-08-05 DIAGNOSIS — K57.20 DIVERTICULITIS OF LARGE INTESTINE WITH PERFORATION AND ABSCESS WITHOUT BLEEDING: ICD-10-CM

## 2020-08-05 DIAGNOSIS — Z88.0 ALLERGY STATUS TO PENICILLIN: ICD-10-CM
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
## 2020-08-05 DIAGNOSIS — Z98.890 OTHER SPECIFIED POSTPROCEDURAL STATES: ICD-10-CM

## 2020-08-05 DIAGNOSIS — T85.528A DISPLACEMENT OF OTHER GASTROINTESTINAL PROSTHETIC DEVICES, IMPLANTS AND GRAFTS, INITIAL ENCOUNTER: ICD-10-CM

## 2020-08-05 DIAGNOSIS — Z92.3 PERSONAL HISTORY OF IRRADIATION: ICD-10-CM
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
## 2020-08-05 LAB
CULTURE RESULTS: SIGNIFICANT CHANGE UP
SPECIMEN SOURCE: SIGNIFICANT CHANGE UP

## 2022-01-31 NOTE — DISCHARGE NOTE PROVIDER - NSDCHHASSISTILLNESS_GEN_ALL_CORE
does not require assistance Melolabial Interpolation Flap Text: We discussed various closure modalities with the patient, including healing by second intention, primary closure, skin graft and various flaps.  The location and configuration of the defect indicated that a melolabial interpolation flap would result in the least disturbance of the position and function of the surrounding anatomic structures, and provide the best result.  The technique, its benefits, alternatives and risks were discussed with the patient.  The patient underwent the procedure as follows: The patient was positioned supine on the operating room table.  The area of the defect and the surrounding skin were anesthetized with buffered 1% lidocaine with epinephrine.  The area was washed with chlorhexidine.  Sterile drapes were applied. \\n\\n\\nA decision was made to reconstruct the defect utilizing an interpolation axial flap and a staged reconstruction.  A telfa template was made of the defect.  This telfa template was then used to outline the melolabial interpolation flap.  The donor and recipient areas for the pedicle flap was then injected with anesthesia.  The flap was excised through the skin and subcutaneous tissue down to the layer of the underlying musculature.  The pedicle flap was carefully excised within this deep plane to maintain its blood supply.  The edges of the donor site were undermined.   The donor site was closed in a primary fashion.  The pedicle was then rotated into position and sutured.  Once the tube was sutured into place, adequate blood supply was confirmed with blanching and refill.  The pedicle was then wrapped with xeroform gauze and dressed appropriately with a telfa and gauze bandage to ensure continued blood supply and protect the attached pedicle.  The second stage of the flap (takedown) would occur after sufficient blood supply has been established, at about 3 weeks.

## 2023-09-11 NOTE — PATIENT PROFILE ADULT - VISION (WITH CORRECTIVE LENSES IF THE PATIENT USUALLY WEARS THEM):
PAST SURGICAL HISTORY:  No significant past surgical history      Normal vision: sees adequately in most situations; can see medication labels, newsprint